# Patient Record
Sex: MALE | Race: BLACK OR AFRICAN AMERICAN | NOT HISPANIC OR LATINO | URBAN - METROPOLITAN AREA
[De-identification: names, ages, dates, MRNs, and addresses within clinical notes are randomized per-mention and may not be internally consistent; named-entity substitution may affect disease eponyms.]

---

## 2017-01-20 ENCOUNTER — OUTPATIENT (OUTPATIENT)
Dept: OUTPATIENT SERVICES | Facility: HOSPITAL | Age: 77
LOS: 1 days | End: 2017-01-20
Payer: COMMERCIAL

## 2017-01-20 VITALS
DIASTOLIC BLOOD PRESSURE: 77 MMHG | HEART RATE: 76 BPM | HEIGHT: 66 IN | RESPIRATION RATE: 12 BRPM | WEIGHT: 121.25 LBS | TEMPERATURE: 98 F | SYSTOLIC BLOOD PRESSURE: 143 MMHG | OXYGEN SATURATION: 100 %

## 2017-01-20 DIAGNOSIS — Z98.89 OTHER SPECIFIED POSTPROCEDURAL STATES: Chronic | ICD-10-CM

## 2017-01-20 DIAGNOSIS — N13.1 HYDRONEPHROSIS WITH URETERAL STRICTURE, NOT ELSEWHERE CLASSIFIED: ICD-10-CM

## 2017-01-20 DIAGNOSIS — N13.5 CROSSING VESSEL AND STRICTURE OF URETER WITHOUT HYDRONEPHROSIS: ICD-10-CM

## 2017-01-20 DIAGNOSIS — Z01.818 ENCOUNTER FOR OTHER PREPROCEDURAL EXAMINATION: ICD-10-CM

## 2017-01-20 LAB
ANION GAP SERPL CALC-SCNC: 16 MMOL/L — SIGNIFICANT CHANGE UP (ref 5–17)
BUN SERPL-MCNC: 44 MG/DL — HIGH (ref 7–23)
CALCIUM SERPL-MCNC: 7.9 MG/DL — LOW (ref 8.4–10.5)
CHLORIDE SERPL-SCNC: 109 MMOL/L — HIGH (ref 96–108)
CO2 SERPL-SCNC: 14 MMOL/L — LOW (ref 22–31)
CREAT SERPL-MCNC: 4.75 MG/DL — HIGH (ref 0.5–1.3)
GLUCOSE SERPL-MCNC: 92 MG/DL — SIGNIFICANT CHANGE UP (ref 70–99)
HCT VFR BLD CALC: 31.3 % — LOW (ref 39–50)
HGB BLD-MCNC: 9.7 G/DL — LOW (ref 13–17)
MCHC RBC-ENTMCNC: 25.1 PG — LOW (ref 27–34)
MCHC RBC-ENTMCNC: 31 GM/DL — LOW (ref 32–36)
MCV RBC AUTO: 81.1 FL — SIGNIFICANT CHANGE UP (ref 80–100)
PLATELET # BLD AUTO: 372 K/UL — SIGNIFICANT CHANGE UP (ref 150–400)
POTASSIUM SERPL-MCNC: 4.6 MMOL/L — SIGNIFICANT CHANGE UP (ref 3.5–5.3)
POTASSIUM SERPL-SCNC: 4.6 MMOL/L — SIGNIFICANT CHANGE UP (ref 3.5–5.3)
RBC # BLD: 3.86 M/UL — LOW (ref 4.2–5.8)
RBC # FLD: 18 % — HIGH (ref 10.3–14.5)
SODIUM SERPL-SCNC: 139 MMOL/L — SIGNIFICANT CHANGE UP (ref 135–145)
WBC # BLD: 6.03 K/UL — SIGNIFICANT CHANGE UP (ref 3.8–10.5)
WBC # FLD AUTO: 6.03 K/UL — SIGNIFICANT CHANGE UP (ref 3.8–10.5)

## 2017-01-20 RX ORDER — SODIUM CHLORIDE 9 MG/ML
3 INJECTION INTRAMUSCULAR; INTRAVENOUS; SUBCUTANEOUS EVERY 8 HOURS
Qty: 0 | Refills: 0 | Status: DISCONTINUED | OUTPATIENT
Start: 2017-01-26 | End: 2017-02-10

## 2017-01-20 RX ORDER — CEFAZOLIN SODIUM 1 G
2000 VIAL (EA) INJECTION ONCE
Qty: 0 | Refills: 0 | Status: DISCONTINUED | OUTPATIENT
Start: 2017-01-26 | End: 2017-02-10

## 2017-01-20 NOTE — H&P PST ADULT - PMH
Brain tumor  Distant history over 30 yrs ago  Carcinoid tumor    GERD (gastroesophageal reflux disease)    Gout    HTN (Hypertension)    Hydronephrosis    Seizure  x1 in july 2014  1 episode of tonic clonic seizure, pt was seen by neurologist and the daughter states everything was normal. No information available.  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 No information available.  Ureteral stricture

## 2017-01-20 NOTE — H&P PST ADULT - PSH
Brain tumor  1973  Craniotomy  History of cystoscopy  LEFT, stent placement 2015  S/P cystoscopy  right ureteral stent insertion 09/2014, 12/2014, and Jan 2016  S/P Right Hemicolectomy  Open right hemicolectomy in 2007 at [place]

## 2017-01-20 NOTE — H&P PST ADULT - FAMILY HISTORY
Sibling  Still living? Yes, Estimated age: Age Unknown  Family history of heart attack, Age at diagnosis: Age Unknown  Family history of cancer, Age at diagnosis: Age Unknown  Family history of dementia, Age at diagnosis: Age Unknown  Family history of diabetes mellitus, Age at diagnosis: Age Unknown

## 2017-01-20 NOTE — H&P PST ADULT - NSANTHOSAYNRD_GEN_A_CORE
No. RADHA screening performed.  STOP BANG Legend: 0-2 = LOW Risk; 3-4 = INTERMEDIATE Risk; 5-8 = HIGH Risk 13.5 inches/No. RADHA screening performed.  STOP BANG Legend: 0-2 = LOW Risk; 3-4 = INTERMEDIATE Risk; 5-8 = HIGH Risk

## 2017-01-20 NOTE — H&P PST ADULT - HISTORY OF PRESENT ILLNESS
77 y/o Black male with PMH: HTN, GERD, carcinoid with liver/kidney mets, R hydronephrosis s/p stent, seizure disorders, distant h/o brain tumor (>30yrs ago). H/O cystoscopy, with multiple right ureteral stent change . Pt presents to PST today for pre op evaluation in preparation for right tandem ureteral stent exchange on 09/29/16. 77 y/o Black male with PMH: HTN, GERD, carcinoid with liver/kidney mets,  seizure disorders( last 2015), distant h/o brain tumor (>30yrs ago). H/O cystoscopy, with multiple right ureteral stent change . Pt presents to PST today for pre op evaluation in preparation for cystoscopy, right ureteral stent exchange on 1/26/17.

## 2017-01-22 LAB
-  AMIKACIN: SIGNIFICANT CHANGE UP
-  AZTREONAM: SIGNIFICANT CHANGE UP
-  CEFEPIME: SIGNIFICANT CHANGE UP
-  CEFTAZIDIME: SIGNIFICANT CHANGE UP
-  CIPROFLOXACIN: SIGNIFICANT CHANGE UP
-  GENTAMICIN: SIGNIFICANT CHANGE UP
-  IMIPENEM: SIGNIFICANT CHANGE UP
-  LEVOFLOXACIN: SIGNIFICANT CHANGE UP
-  MEROPENEM: SIGNIFICANT CHANGE UP
-  PIPERACILLIN/TAZOBACTAM: SIGNIFICANT CHANGE UP
-  TOBRAMYCIN: SIGNIFICANT CHANGE UP
METHOD TYPE: SIGNIFICANT CHANGE UP

## 2017-01-23 LAB
CULTURE RESULTS: SIGNIFICANT CHANGE UP
ORGANISM # SPEC MICROSCOPIC CNT: SIGNIFICANT CHANGE UP
ORGANISM # SPEC MICROSCOPIC CNT: SIGNIFICANT CHANGE UP
SPECIMEN SOURCE: SIGNIFICANT CHANGE UP

## 2017-01-24 ENCOUNTER — CLINICAL ADVICE (OUTPATIENT)
Age: 77
End: 2017-01-24

## 2017-01-26 ENCOUNTER — OUTPATIENT (OUTPATIENT)
Dept: OUTPATIENT SERVICES | Facility: HOSPITAL | Age: 77
LOS: 1 days | Discharge: HOME | End: 2017-01-26
Payer: COMMERCIAL

## 2017-01-26 ENCOUNTER — APPOINTMENT (OUTPATIENT)
Dept: UROLOGY | Facility: HOSPITAL | Age: 77
End: 2017-01-26

## 2017-01-26 VITALS
DIASTOLIC BLOOD PRESSURE: 72 MMHG | OXYGEN SATURATION: 99 % | SYSTOLIC BLOOD PRESSURE: 162 MMHG | TEMPERATURE: 98 F | RESPIRATION RATE: 18 BRPM | HEIGHT: 66 IN | WEIGHT: 121.92 LBS | HEART RATE: 94 BPM

## 2017-01-26 VITALS
HEART RATE: 69 BPM | SYSTOLIC BLOOD PRESSURE: 127 MMHG | DIASTOLIC BLOOD PRESSURE: 66 MMHG | RESPIRATION RATE: 18 BRPM | OXYGEN SATURATION: 98 %

## 2017-01-26 DIAGNOSIS — Z98.89 OTHER SPECIFIED POSTPROCEDURAL STATES: Chronic | ICD-10-CM

## 2017-01-26 DIAGNOSIS — N13.5 CROSSING VESSEL AND STRICTURE OF URETER WITHOUT HYDRONEPHROSIS: ICD-10-CM

## 2017-01-26 PROCEDURE — 76000 FLUOROSCOPY <1 HR PHYS/QHP: CPT

## 2017-01-26 PROCEDURE — C1769: CPT

## 2017-01-26 PROCEDURE — 80048 BASIC METABOLIC PNL TOTAL CA: CPT

## 2017-01-26 PROCEDURE — 74420 UROGRAPHY RTRGR +-KUB: CPT | Mod: 26

## 2017-01-26 PROCEDURE — 85027 COMPLETE CBC AUTOMATED: CPT

## 2017-01-26 PROCEDURE — 52332 CYSTOSCOPY AND TREATMENT: CPT | Mod: RT

## 2017-01-26 PROCEDURE — 87186 SC STD MICRODIL/AGAR DIL: CPT

## 2017-01-26 PROCEDURE — C1758: CPT

## 2017-01-26 PROCEDURE — 87086 URINE CULTURE/COLONY COUNT: CPT

## 2017-01-26 PROCEDURE — G0463: CPT

## 2017-01-26 PROCEDURE — C2617: CPT

## 2017-01-26 RX ORDER — OXYCODONE HYDROCHLORIDE 5 MG/1
1 TABLET ORAL
Qty: 15 | Refills: 0 | OUTPATIENT
Start: 2017-01-26

## 2017-01-26 RX ORDER — ONDANSETRON 8 MG/1
4 TABLET, FILM COATED ORAL ONCE
Qty: 0 | Refills: 0 | Status: DISCONTINUED | OUTPATIENT
Start: 2017-01-26 | End: 2017-01-26

## 2017-01-26 RX ORDER — AMLODIPINE BESYLATE 10 MG/1
10 TABLET ORAL
Qty: 30 | Refills: 0 | Status: ACTIVE | COMMUNITY
Start: 2016-01-20

## 2017-01-26 RX ORDER — CIPROFLOXACIN HYDROCHLORIDE 500 MG/1
500 TABLET, FILM COATED ORAL
Qty: 14 | Refills: 0 | Status: COMPLETED | COMMUNITY
Start: 2016-09-22

## 2017-01-26 RX ORDER — HYDROMORPHONE HYDROCHLORIDE 2 MG/ML
0.25 INJECTION INTRAMUSCULAR; INTRAVENOUS; SUBCUTANEOUS
Qty: 0 | Refills: 0 | Status: DISCONTINUED | OUTPATIENT
Start: 2017-01-26 | End: 2017-01-26

## 2017-01-26 RX ORDER — SODIUM CHLORIDE 9 MG/ML
1000 INJECTION, SOLUTION INTRAVENOUS
Qty: 0 | Refills: 0 | Status: DISCONTINUED | OUTPATIENT
Start: 2017-01-26 | End: 2017-02-10

## 2017-01-26 RX ORDER — PHENAZOPYRIDINE HCL 100 MG
200 TABLET ORAL ONCE
Qty: 0 | Refills: 0 | Status: COMPLETED | OUTPATIENT
Start: 2017-01-26 | End: 2017-01-26

## 2017-01-26 RX ORDER — CIPROFLOXACIN LACTATE 400MG/40ML
1 VIAL (ML) INTRAVENOUS
Qty: 10 | Refills: 0 | OUTPATIENT
Start: 2017-01-26 | End: 2017-01-31

## 2017-01-26 RX ADMIN — Medication 200 MILLIGRAM(S): at 12:42

## 2017-01-26 RX ADMIN — SODIUM CHLORIDE 3 MILLILITER(S): 9 INJECTION INTRAMUSCULAR; INTRAVENOUS; SUBCUTANEOUS at 08:36

## 2017-01-26 NOTE — ASU DISCHARGE PLAN (ADULT/PEDIATRIC). - MEDICATION SUMMARY - MEDICATIONS TO TAKE
I will START or STAY ON the medications listed below when I get home from the hospital:    Philip Naphcon  -- 2 drop(s) intraocular ,both eyes As Needed  -- Indication: For Home med    acetaminophen-oxyCODONE 325 mg-5 mg oral tablet  -- 1 tab(s) by mouth every 6 hours, As Needed -for severe pain MDD:4 tabs  -- Caution federal law prohibits the transfer of this drug to any person other  than the person for whom it was prescribed.  May cause drowsiness.  Alcohol may intensify this effect.  Use care when operating dangerous machinery.  This prescription cannot be refilled.  This product contains acetaminophen.  Do not use  with any other product containing acetaminophen to prevent possible liver damage.  Using more of this medication than prescribed may cause serious breathing problems.    -- Indication: For pain med    levETIRAcetam 250 mg oral tablet  -- 1 tab(s) by mouth 2 times a day  -- Indication: For home med    Norvasc 10 mg oral tablet  -- 1 tab(s) by mouth once a day in am  -- Indication: For home med    Cipro 250 mg oral tablet  -- 1 tab(s) by mouth every 12 hours  -- Avoid prolonged or excessive exposure to direct and/or artificial sunlight while taking this medication.  Check with your doctor before becoming pregnant.  Do not take dairy products, antacids, or iron preparations within one hour of this medication.  Finish all this medication unless otherwise directed by prescriber.  Medication should be taken with plenty of water.    -- Indication: For antibiotic    Vitamin B Complex 100  -- 1 tab(s) by mouth once a day, am  -- Indication: For home med

## 2017-01-26 NOTE — ASU DISCHARGE PLAN (ADULT/PEDIATRIC). - NOTIFY
Bleeding that does not stop/Persistent Nausea and Vomiting/Unable to Urinate/Pain not relieved by Medications/Inability to Tolerate Liquids or Foods/Fever greater than 101

## 2017-01-26 NOTE — BRIEF OPERATIVE NOTE - PRE-OP DX
Hydronephrosis of right kidney  01/26/2017    Active  Jonas Doyle  Ureteral obstruction, right  01/26/2017    Active  Jonas Doyle

## 2017-01-26 NOTE — BRIEF OPERATIVE NOTE - POST-OP DX
Hydronephrosis, right  01/26/2017    Active  Jonas Doyle  Ureteral obstruction, right  01/26/2017    Active  Jonas Doyle

## 2017-02-12 ENCOUNTER — FORM ENCOUNTER (OUTPATIENT)
Age: 77
End: 2017-02-12

## 2017-02-13 ENCOUNTER — APPOINTMENT (OUTPATIENT)
Dept: UROLOGY | Facility: CLINIC | Age: 77
End: 2017-02-13

## 2017-02-13 ENCOUNTER — APPOINTMENT (OUTPATIENT)
Dept: CT IMAGING | Facility: IMAGING CENTER | Age: 77
End: 2017-02-13

## 2017-02-13 ENCOUNTER — OUTPATIENT (OUTPATIENT)
Dept: OUTPATIENT SERVICES | Facility: HOSPITAL | Age: 77
LOS: 1 days | End: 2017-02-13
Payer: COMMERCIAL

## 2017-02-13 VITALS
SYSTOLIC BLOOD PRESSURE: 142 MMHG | BODY MASS INDEX: 19.93 KG/M2 | TEMPERATURE: 97.8 F | HEART RATE: 72 BPM | DIASTOLIC BLOOD PRESSURE: 65 MMHG | WEIGHT: 124 LBS | RESPIRATION RATE: 16 BRPM | HEIGHT: 66 IN

## 2017-02-13 DIAGNOSIS — N13.30 UNSPECIFIED HYDRONEPHROSIS: ICD-10-CM

## 2017-02-13 DIAGNOSIS — N13.5 CROSSING VESSEL AND STRICTURE OF URETER W/OUT HYDRONEPHROSIS: ICD-10-CM

## 2017-02-13 DIAGNOSIS — Z98.89 OTHER SPECIFIED POSTPROCEDURAL STATES: Chronic | ICD-10-CM

## 2017-02-13 DIAGNOSIS — D3A.00 BENIGN CARCINOID TUMOR OF UNSPECIFIED SITE: ICD-10-CM

## 2017-02-13 DIAGNOSIS — N13.5 CROSSING VESSEL AND STRICTURE OF URETER WITHOUT HYDRONEPHROSIS: ICD-10-CM

## 2017-02-13 PROCEDURE — 74176 CT ABD & PELVIS W/O CONTRAST: CPT

## 2017-02-13 RX ORDER — OXYCODONE AND ACETAMINOPHEN 5; 325 MG/1; MG/1
5-325 TABLET ORAL
Qty: 15 | Refills: 0 | Status: COMPLETED | COMMUNITY
Start: 2017-01-26

## 2017-06-22 ENCOUNTER — OUTPATIENT (OUTPATIENT)
Dept: OUTPATIENT SERVICES | Facility: HOSPITAL | Age: 77
LOS: 1 days | End: 2017-06-22
Payer: COMMERCIAL

## 2017-06-22 VITALS
HEIGHT: 66 IN | TEMPERATURE: 98 F | OXYGEN SATURATION: 100 % | RESPIRATION RATE: 14 BRPM | HEART RATE: 63 BPM | DIASTOLIC BLOOD PRESSURE: 75 MMHG | WEIGHT: 123.02 LBS | SYSTOLIC BLOOD PRESSURE: 140 MMHG

## 2017-06-22 DIAGNOSIS — I10 ESSENTIAL (PRIMARY) HYPERTENSION: ICD-10-CM

## 2017-06-22 DIAGNOSIS — Z98.89 OTHER SPECIFIED POSTPROCEDURAL STATES: Chronic | ICD-10-CM

## 2017-06-22 DIAGNOSIS — G40.909 EPILEPSY, UNSPECIFIED, NOT INTRACTABLE, WITHOUT STATUS EPILEPTICUS: ICD-10-CM

## 2017-06-22 DIAGNOSIS — Z01.818 ENCOUNTER FOR OTHER PREPROCEDURAL EXAMINATION: ICD-10-CM

## 2017-06-22 DIAGNOSIS — N13.1 HYDRONEPHROSIS WITH URETERAL STRICTURE, NOT ELSEWHERE CLASSIFIED: ICD-10-CM

## 2017-06-22 DIAGNOSIS — N13.5 CROSSING VESSEL AND STRICTURE OF URETER WITHOUT HYDRONEPHROSIS: ICD-10-CM

## 2017-06-22 LAB
ANION GAP SERPL CALC-SCNC: 17 MMOL/L — SIGNIFICANT CHANGE UP (ref 5–17)
BUN SERPL-MCNC: 47 MG/DL — HIGH (ref 7–23)
CALCIUM SERPL-MCNC: 7 MG/DL — LOW (ref 8.4–10.5)
CHLORIDE SERPL-SCNC: 108 MMOL/L — SIGNIFICANT CHANGE UP (ref 96–108)
CO2 SERPL-SCNC: 13 MMOL/L — LOW (ref 22–31)
CREAT SERPL-MCNC: 4.77 MG/DL — HIGH (ref 0.5–1.3)
GLUCOSE SERPL-MCNC: 64 MG/DL — LOW (ref 70–99)
HCT VFR BLD CALC: 30 % — LOW (ref 39–50)
HGB BLD-MCNC: 9.5 G/DL — LOW (ref 13–17)
MCHC RBC-ENTMCNC: 25.5 PG — LOW (ref 27–34)
MCHC RBC-ENTMCNC: 31.7 GM/DL — LOW (ref 32–36)
MCV RBC AUTO: 80.6 FL — SIGNIFICANT CHANGE UP (ref 80–100)
PLATELET # BLD AUTO: 371 K/UL — SIGNIFICANT CHANGE UP (ref 150–400)
POTASSIUM SERPL-MCNC: 4.9 MMOL/L — SIGNIFICANT CHANGE UP (ref 3.5–5.3)
POTASSIUM SERPL-SCNC: 4.9 MMOL/L — SIGNIFICANT CHANGE UP (ref 3.5–5.3)
RBC # BLD: 3.72 M/UL — LOW (ref 4.2–5.8)
RBC # FLD: 18 % — HIGH (ref 10.3–14.5)
SODIUM SERPL-SCNC: 138 MMOL/L — SIGNIFICANT CHANGE UP (ref 135–145)
WBC # BLD: 6.03 K/UL — SIGNIFICANT CHANGE UP (ref 3.8–10.5)
WBC # FLD AUTO: 6.03 K/UL — SIGNIFICANT CHANGE UP (ref 3.8–10.5)

## 2017-06-22 RX ORDER — CEFAZOLIN SODIUM 1 G
2000 VIAL (EA) INJECTION ONCE
Qty: 0 | Refills: 0 | Status: DISCONTINUED | OUTPATIENT
Start: 2017-06-29 | End: 2017-07-14

## 2017-06-22 NOTE — H&P PST ADULT - GASTROINTESTINAL COMMENTS
hx tumor  carcinoid  tumor abdominal area been  stable follow-up by PMD get treatment every month nontender

## 2017-06-22 NOTE — H&P PST ADULT - PSH
Brain tumor  1973  Craniotomy  History of cystoscopy  LEFT, stent placement 2015  S/P cystoscopy  right ureteral stent insertion 09/2014, 12/2014, and Jan 2016    now change t every 5 months  S/P Right Hemicolectomy  Open right hemicolectomy in 2007 at [place]

## 2017-06-22 NOTE — H&P PST ADULT - HISTORY OF PRESENT ILLNESS
76 y/o male for  cystoscopy and right tandem ureteral stent change. Patient has hx of ureteral stricture since 2014 and has regular stent change due this time every 5 months.

## 2017-06-22 NOTE — H&P PST ADULT - PROBLEM SELECTOR PLAN 1
cystoscopy right tandem ureteral stent change cystoscopy right tandem ureteral stent change   Patient has own appt to see Dr Venkatesh cota

## 2017-06-22 NOTE — H&P PST ADULT - PMH
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 No information available.  Seizure disorder  on meds controlled no episode  x 1 year as per daughter  Ureteral stricture

## 2017-06-24 LAB
-  AMIKACIN: SIGNIFICANT CHANGE UP
-  AMPICILLIN/SULBACTAM: SIGNIFICANT CHANGE UP
-  AMPICILLIN: SIGNIFICANT CHANGE UP
-  AZTREONAM: SIGNIFICANT CHANGE UP
-  CEFAZOLIN: SIGNIFICANT CHANGE UP
-  CEFEPIME: SIGNIFICANT CHANGE UP
-  CEFOXITIN: SIGNIFICANT CHANGE UP
-  CEFTAZIDIME: SIGNIFICANT CHANGE UP
-  CEFTRIAXONE: SIGNIFICANT CHANGE UP
-  CIPROFLOXACIN: SIGNIFICANT CHANGE UP
-  ERTAPENEM: SIGNIFICANT CHANGE UP
-  GENTAMICIN: SIGNIFICANT CHANGE UP
-  LEVOFLOXACIN: SIGNIFICANT CHANGE UP
-  MEROPENEM: SIGNIFICANT CHANGE UP
-  NITROFURANTOIN: SIGNIFICANT CHANGE UP
-  PIPERACILLIN/TAZOBACTAM: SIGNIFICANT CHANGE UP
-  TOBRAMYCIN: SIGNIFICANT CHANGE UP
-  TRIMETHOPRIM/SULFAMETHOXAZOLE: SIGNIFICANT CHANGE UP
CULTURE RESULTS: SIGNIFICANT CHANGE UP
METHOD TYPE: SIGNIFICANT CHANGE UP
ORGANISM # SPEC MICROSCOPIC CNT: SIGNIFICANT CHANGE UP
ORGANISM # SPEC MICROSCOPIC CNT: SIGNIFICANT CHANGE UP
SPECIMEN SOURCE: SIGNIFICANT CHANGE UP

## 2017-06-26 RX ORDER — CIPROFLOXACIN HYDROCHLORIDE 250 MG/1
250 TABLET, FILM COATED ORAL
Qty: 14 | Refills: 0 | Status: ACTIVE | COMMUNITY
Start: 2017-06-26 | End: 1900-01-01

## 2017-06-29 ENCOUNTER — OUTPATIENT (OUTPATIENT)
Dept: OUTPATIENT SERVICES | Facility: HOSPITAL | Age: 77
LOS: 1 days | End: 2017-06-29
Payer: COMMERCIAL

## 2017-06-29 ENCOUNTER — TRANSCRIPTION ENCOUNTER (OUTPATIENT)
Age: 77
End: 2017-06-29

## 2017-06-29 ENCOUNTER — APPOINTMENT (OUTPATIENT)
Dept: UROLOGY | Facility: HOSPITAL | Age: 77
End: 2017-06-29

## 2017-06-29 VITALS
HEIGHT: 66 IN | SYSTOLIC BLOOD PRESSURE: 158 MMHG | WEIGHT: 126.99 LBS | HEART RATE: 80 BPM | OXYGEN SATURATION: 99 % | TEMPERATURE: 98 F | DIASTOLIC BLOOD PRESSURE: 75 MMHG | RESPIRATION RATE: 18 BRPM

## 2017-06-29 VITALS
HEART RATE: 69 BPM | DIASTOLIC BLOOD PRESSURE: 69 MMHG | SYSTOLIC BLOOD PRESSURE: 133 MMHG | RESPIRATION RATE: 17 BRPM | TEMPERATURE: 98 F | OXYGEN SATURATION: 99 %

## 2017-06-29 DIAGNOSIS — Z98.89 OTHER SPECIFIED POSTPROCEDURAL STATES: Chronic | ICD-10-CM

## 2017-06-29 DIAGNOSIS — N13.5 CROSSING VESSEL AND STRICTURE OF URETER WITHOUT HYDRONEPHROSIS: ICD-10-CM

## 2017-06-29 PROCEDURE — C1769: CPT

## 2017-06-29 PROCEDURE — C1758: CPT

## 2017-06-29 PROCEDURE — 87186 SC STD MICRODIL/AGAR DIL: CPT

## 2017-06-29 PROCEDURE — 52332 CYSTOSCOPY AND TREATMENT: CPT | Mod: RT

## 2017-06-29 PROCEDURE — 76000 FLUOROSCOPY <1 HR PHYS/QHP: CPT

## 2017-06-29 PROCEDURE — 74420 UROGRAPHY RTRGR +-KUB: CPT | Mod: 26

## 2017-06-29 PROCEDURE — 87086 URINE CULTURE/COLONY COUNT: CPT

## 2017-06-29 PROCEDURE — G0463: CPT

## 2017-06-29 PROCEDURE — 80048 BASIC METABOLIC PNL TOTAL CA: CPT

## 2017-06-29 PROCEDURE — C2617: CPT

## 2017-06-29 PROCEDURE — 85027 COMPLETE CBC AUTOMATED: CPT

## 2017-06-29 RX ORDER — HYDROMORPHONE HYDROCHLORIDE 2 MG/ML
0.25 INJECTION INTRAMUSCULAR; INTRAVENOUS; SUBCUTANEOUS
Qty: 0 | Refills: 0 | Status: DISCONTINUED | OUTPATIENT
Start: 2017-06-29 | End: 2017-06-29

## 2017-06-29 RX ORDER — SODIUM CHLORIDE 9 MG/ML
1000 INJECTION INTRAMUSCULAR; INTRAVENOUS; SUBCUTANEOUS
Qty: 0 | Refills: 0 | Status: DISCONTINUED | OUTPATIENT
Start: 2017-06-29 | End: 2017-07-14

## 2017-06-29 RX ORDER — ACETAMINOPHEN 500 MG
650 TABLET ORAL EVERY 6 HOURS
Qty: 0 | Refills: 0 | Status: DISCONTINUED | OUTPATIENT
Start: 2017-06-29 | End: 2017-07-14

## 2017-06-29 RX ORDER — ONDANSETRON 8 MG/1
4 TABLET, FILM COATED ORAL ONCE
Qty: 0 | Refills: 0 | Status: DISCONTINUED | OUTPATIENT
Start: 2017-06-29 | End: 2017-06-29

## 2017-06-29 RX ORDER — OXYCODONE HYDROCHLORIDE 5 MG/1
1 TABLET ORAL
Qty: 20 | Refills: 0 | OUTPATIENT
Start: 2017-06-29

## 2017-06-29 RX ORDER — SODIUM CHLORIDE 9 MG/ML
3 INJECTION INTRAMUSCULAR; INTRAVENOUS; SUBCUTANEOUS EVERY 8 HOURS
Qty: 0 | Refills: 0 | Status: DISCONTINUED | OUTPATIENT
Start: 2017-06-29 | End: 2017-06-29

## 2017-06-29 RX ADMIN — SODIUM CHLORIDE 125 MILLILITER(S): 9 INJECTION INTRAMUSCULAR; INTRAVENOUS; SUBCUTANEOUS at 16:39

## 2017-06-29 NOTE — ASU DISCHARGE PLAN (ADULT/PEDIATRIC). - MEDICATION SUMMARY - MEDICATIONS TO TAKE
I will START or STAY ON the medications listed below when I get home from the hospital:    Philip Naphcon  -- 2 drop(s) intraocular ,both eyes As Needed  -- Indication: For Home medication    acetaminophen-oxycodone 325 mg-5 mg oral tablet  -- 1 tab(s) by mouth every 4 hours, As needed, Mild Pain MDD:8  -- Indication: For Pain medication     levETIRAcetam 250 mg oral tablet  -- 1 tab(s) by mouth 2 times a day  -- Indication: For home medication    Norvasc 10 mg oral tablet  -- 1 tab(s) by mouth once a day in am  -- Indication: For home medication     Vitamin B Complex 100  -- 1 tab(s) by mouth once a day, am  -- Indication: For home medication

## 2017-08-29 ENCOUNTER — INPATIENT (INPATIENT)
Facility: HOSPITAL | Age: 77
LOS: 1 days | End: 2017-08-31
Attending: INTERNAL MEDICINE | Admitting: INTERNAL MEDICINE
Payer: MEDICARE

## 2017-08-29 VITALS
OXYGEN SATURATION: 100 % | HEART RATE: 95 BPM | DIASTOLIC BLOOD PRESSURE: 68 MMHG | TEMPERATURE: 93 F | SYSTOLIC BLOOD PRESSURE: 128 MMHG | RESPIRATION RATE: 18 BRPM

## 2017-08-29 DIAGNOSIS — G40.909 EPILEPSY, UNSPECIFIED, NOT INTRACTABLE, WITHOUT STATUS EPILEPTICUS: ICD-10-CM

## 2017-08-29 DIAGNOSIS — I46.9 CARDIAC ARREST, CAUSE UNSPECIFIED: ICD-10-CM

## 2017-08-29 DIAGNOSIS — D3A.00 BENIGN CARCINOID TUMOR OF UNSPECIFIED SITE: ICD-10-CM

## 2017-08-29 DIAGNOSIS — Z98.89 OTHER SPECIFIED POSTPROCEDURAL STATES: Chronic | ICD-10-CM

## 2017-08-29 DIAGNOSIS — Z29.9 ENCOUNTER FOR PROPHYLACTIC MEASURES, UNSPECIFIED: ICD-10-CM

## 2017-08-29 DIAGNOSIS — T68.XXXA HYPOTHERMIA, INITIAL ENCOUNTER: ICD-10-CM

## 2017-08-29 PROBLEM — N13.1 HYDRONEPHROSIS WITH URETERAL STRICTURE, NOT ELSEWHERE CLASSIFIED: Chronic | Status: ACTIVE | Noted: 2017-01-20

## 2017-08-29 LAB
ALBUMIN SERPL ELPH-MCNC: 3.7 G/DL — SIGNIFICANT CHANGE UP (ref 3.3–5)
ALP SERPL-CCNC: 122 U/L — HIGH (ref 40–120)
ALT FLD-CCNC: 10 U/L — SIGNIFICANT CHANGE UP (ref 4–41)
APTT BLD: 43.6 SEC — HIGH (ref 27.5–37.4)
AST SERPL-CCNC: 20 U/L — SIGNIFICANT CHANGE UP (ref 4–40)
BASE EXCESS BLDV CALC-SCNC: -26.5 MMOL/L — SIGNIFICANT CHANGE UP
BASOPHILS # BLD AUTO: 0.03 K/UL — SIGNIFICANT CHANGE UP (ref 0–0.2)
BASOPHILS NFR BLD AUTO: 0.3 % — SIGNIFICANT CHANGE UP (ref 0–2)
BILIRUB SERPL-MCNC: 0.6 MG/DL — SIGNIFICANT CHANGE UP (ref 0.2–1.2)
BLD GP AB SCN SERPL QL: NEGATIVE — SIGNIFICANT CHANGE UP
BLOOD GAS VENOUS - CREATININE: 5.58 MG/DL — HIGH (ref 0.5–1.3)
BUN SERPL-MCNC: 39 MG/DL — HIGH (ref 7–23)
CALCIUM SERPL-MCNC: 7 MG/DL — LOW (ref 8.4–10.5)
CHLORIDE BLDV-SCNC: 117 MMOL/L — HIGH (ref 96–108)
CHLORIDE SERPL-SCNC: 109 MMOL/L — HIGH (ref 98–107)
CK MB BLD-MCNC: 1.2 — SIGNIFICANT CHANGE UP (ref 0–2.5)
CK MB BLD-MCNC: 2.76 NG/ML — SIGNIFICANT CHANGE UP (ref 1–6.6)
CK SERPL-CCNC: 226 U/L — HIGH (ref 30–200)
CO2 SERPL-SCNC: 4 MMOL/L — CRITICAL LOW (ref 22–31)
CREAT SERPL-MCNC: 5.33 MG/DL — HIGH (ref 0.5–1.3)
EOSINOPHIL # BLD AUTO: 0.13 K/UL — SIGNIFICANT CHANGE UP (ref 0–0.5)
EOSINOPHIL NFR BLD AUTO: 1.2 % — SIGNIFICANT CHANGE UP (ref 0–6)
GAS PNL BLDV: 136 MMOL/L — SIGNIFICANT CHANGE UP (ref 136–146)
GLUCOSE BLDV-MCNC: 228 — HIGH (ref 70–99)
GLUCOSE SERPL-MCNC: 236 MG/DL — HIGH (ref 70–99)
HCO3 BLDV-SCNC: 5 MMOL/L — LOW (ref 20–27)
HCT VFR BLD CALC: 31.1 % — LOW (ref 39–50)
HCT VFR BLDV CALC: 31.2 % — LOW (ref 39–51)
HGB BLD-MCNC: 9.4 G/DL — LOW (ref 13–17)
HGB BLDV-MCNC: 10.1 G/DL — LOW (ref 13–17)
IMM GRANULOCYTES # BLD AUTO: 0.14 # — SIGNIFICANT CHANGE UP
IMM GRANULOCYTES NFR BLD AUTO: 1.3 % — SIGNIFICANT CHANGE UP (ref 0–1.5)
INR BLD: 1.05 — SIGNIFICANT CHANGE UP (ref 0.88–1.17)
LACTATE BLDV-MCNC: 10.5 MMOL/L — CRITICAL HIGH (ref 0.5–2)
LYMPHOCYTES # BLD AUTO: 2.31 K/UL — SIGNIFICANT CHANGE UP (ref 1–3.3)
LYMPHOCYTES # BLD AUTO: 22 % — SIGNIFICANT CHANGE UP (ref 13–44)
MCHC RBC-ENTMCNC: 27.1 PG — SIGNIFICANT CHANGE UP (ref 27–34)
MCHC RBC-ENTMCNC: 30.2 % — LOW (ref 32–36)
MCV RBC AUTO: 89.6 FL — SIGNIFICANT CHANGE UP (ref 80–100)
MONOCYTES # BLD AUTO: 0.76 K/UL — SIGNIFICANT CHANGE UP (ref 0–0.9)
MONOCYTES NFR BLD AUTO: 7.2 % — SIGNIFICANT CHANGE UP (ref 2–14)
NEUTROPHILS # BLD AUTO: 7.13 K/UL — SIGNIFICANT CHANGE UP (ref 1.8–7.4)
NEUTROPHILS NFR BLD AUTO: 68 % — SIGNIFICANT CHANGE UP (ref 43–77)
NRBC # FLD: 0.03 — SIGNIFICANT CHANGE UP
PCO2 BLDV: 55 MMHG — HIGH (ref 41–51)
PH BLDV: < 6.81 PH — CRITICAL LOW (ref 7.32–7.43)
PLATELET # BLD AUTO: 289 K/UL — SIGNIFICANT CHANGE UP (ref 150–400)
PMV BLD: 10.4 FL — SIGNIFICANT CHANGE UP (ref 7–13)
PO2 BLDV: 212 MMHG — HIGH (ref 35–40)
POTASSIUM BLDV-SCNC: 8 MMOL/L — CRITICAL HIGH (ref 3.4–4.5)
POTASSIUM SERPL-MCNC: 4.9 MMOL/L — SIGNIFICANT CHANGE UP (ref 3.5–5.3)
POTASSIUM SERPL-SCNC: 4.9 MMOL/L — SIGNIFICANT CHANGE UP (ref 3.5–5.3)
PROT SERPL-MCNC: 6.4 G/DL — SIGNIFICANT CHANGE UP (ref 6–8.3)
PROTHROM AB SERPL-ACNC: 11.8 SEC — SIGNIFICANT CHANGE UP (ref 9.8–13.1)
RBC # BLD: 3.47 M/UL — LOW (ref 4.2–5.8)
RBC # FLD: 17.4 % — HIGH (ref 10.3–14.5)
RH IG SCN BLD-IMP: POSITIVE — SIGNIFICANT CHANGE UP
SAO2 % BLDV: 96.6 % — HIGH (ref 60–85)
SODIUM SERPL-SCNC: 139 MMOL/L — SIGNIFICANT CHANGE UP (ref 135–145)
TROPONIN T SERPL-MCNC: < 0.06 NG/ML — SIGNIFICANT CHANGE UP (ref 0–0.06)
WBC # BLD: 10.5 K/UL — SIGNIFICANT CHANGE UP (ref 3.8–10.5)
WBC # FLD AUTO: 10.5 K/UL — SIGNIFICANT CHANGE UP (ref 3.8–10.5)

## 2017-08-29 PROCEDURE — 99291 CRITICAL CARE FIRST HOUR: CPT

## 2017-08-29 PROCEDURE — 71250 CT THORAX DX C-: CPT | Mod: 26

## 2017-08-29 PROCEDURE — 70450 CT HEAD/BRAIN W/O DYE: CPT | Mod: 26

## 2017-08-29 PROCEDURE — 74176 CT ABD & PELVIS W/O CONTRAST: CPT | Mod: 26

## 2017-08-29 PROCEDURE — 71010: CPT | Mod: 26

## 2017-08-29 RX ORDER — FENTANYL CITRATE 50 UG/ML
1.5 INJECTION INTRAVENOUS
Qty: 2500 | Refills: 0 | Status: DISCONTINUED | OUTPATIENT
Start: 2017-08-29 | End: 2017-08-30

## 2017-08-29 RX ORDER — LEVETIRACETAM 250 MG/1
250 TABLET, FILM COATED ORAL EVERY 12 HOURS
Qty: 0 | Refills: 0 | Status: DISCONTINUED | OUTPATIENT
Start: 2017-08-29 | End: 2017-08-31

## 2017-08-29 RX ORDER — PIPERACILLIN AND TAZOBACTAM 4; .5 G/20ML; G/20ML
3.38 INJECTION, POWDER, LYOPHILIZED, FOR SOLUTION INTRAVENOUS ONCE
Qty: 0 | Refills: 0 | Status: COMPLETED | OUTPATIENT
Start: 2017-08-29 | End: 2017-08-29

## 2017-08-29 RX ORDER — SODIUM CHLORIDE 9 MG/ML
1000 INJECTION INTRAMUSCULAR; INTRAVENOUS; SUBCUTANEOUS ONCE
Qty: 0 | Refills: 0 | Status: COMPLETED | OUTPATIENT
Start: 2017-08-29 | End: 2017-08-29

## 2017-08-29 RX ORDER — FENTANYL CITRATE 50 UG/ML
50 INJECTION INTRAVENOUS ONCE
Qty: 0 | Refills: 0 | Status: DISCONTINUED | OUTPATIENT
Start: 2017-08-29 | End: 2017-08-29

## 2017-08-29 RX ORDER — HEPARIN SODIUM 5000 [USP'U]/ML
5000 INJECTION INTRAVENOUS; SUBCUTANEOUS EVERY 8 HOURS
Qty: 0 | Refills: 0 | Status: DISCONTINUED | OUTPATIENT
Start: 2017-08-29 | End: 2017-08-31

## 2017-08-29 RX ORDER — PIPERACILLIN AND TAZOBACTAM 4; .5 G/20ML; G/20ML
3.38 INJECTION, POWDER, LYOPHILIZED, FOR SOLUTION INTRAVENOUS EVERY 12 HOURS
Qty: 0 | Refills: 0 | Status: DISCONTINUED | OUTPATIENT
Start: 2017-08-29 | End: 2017-08-31

## 2017-08-29 RX ORDER — NOREPINEPHRINE BITARTRATE/D5W 8 MG/250ML
0.1 PLASTIC BAG, INJECTION (ML) INTRAVENOUS
Qty: 8 | Refills: 0 | Status: DISCONTINUED | OUTPATIENT
Start: 2017-08-29 | End: 2017-08-31

## 2017-08-29 RX ORDER — VANCOMYCIN HCL 1 G
1000 VIAL (EA) INTRAVENOUS ONCE
Qty: 0 | Refills: 0 | Status: COMPLETED | OUTPATIENT
Start: 2017-08-29 | End: 2017-08-29

## 2017-08-29 RX ADMIN — PIPERACILLIN AND TAZOBACTAM 25 GRAM(S): 4; .5 INJECTION, POWDER, LYOPHILIZED, FOR SOLUTION INTRAVENOUS at 18:29

## 2017-08-29 RX ADMIN — FENTANYL CITRATE 50 MICROGRAM(S): 50 INJECTION INTRAVENOUS at 13:12

## 2017-08-29 RX ADMIN — SODIUM CHLORIDE 2000 MILLILITER(S): 9 INJECTION INTRAMUSCULAR; INTRAVENOUS; SUBCUTANEOUS at 22:05

## 2017-08-29 RX ADMIN — Medication 4 MILLIGRAM(S): at 15:00

## 2017-08-29 RX ADMIN — Medication 11.25 MICROGRAM(S)/KG/MIN: at 09:20

## 2017-08-29 RX ADMIN — FENTANYL CITRATE 5.36 MICROGRAM(S)/KG/HR: 50 INJECTION INTRAVENOUS at 13:12

## 2017-08-29 RX ADMIN — PIPERACILLIN AND TAZOBACTAM 200 GRAM(S): 4; .5 INJECTION, POWDER, LYOPHILIZED, FOR SOLUTION INTRAVENOUS at 10:03

## 2017-08-29 RX ADMIN — Medication 11.25 MICROGRAM(S)/KG/MIN: at 13:56

## 2017-08-29 RX ADMIN — FENTANYL CITRATE 8.04 MICROGRAM(S)/KG/HR: 50 INJECTION INTRAVENOUS at 22:05

## 2017-08-29 RX ADMIN — Medication 11.25 MICROGRAM(S)/KG/MIN: at 22:05

## 2017-08-29 RX ADMIN — SODIUM CHLORIDE 1000 MILLILITER(S): 9 INJECTION INTRAMUSCULAR; INTRAVENOUS; SUBCUTANEOUS at 09:20

## 2017-08-29 RX ADMIN — Medication 250 MILLIGRAM(S): at 10:45

## 2017-08-29 RX ADMIN — FENTANYL CITRATE 1.5 MICROGRAM(S)/KG/HR: 50 INJECTION INTRAVENOUS at 13:56

## 2017-08-29 RX ADMIN — FENTANYL CITRATE 50 MICROGRAM(S): 50 INJECTION INTRAVENOUS at 13:00

## 2017-08-29 RX ADMIN — LEVETIRACETAM 400 MILLIGRAM(S): 250 TABLET, FILM COATED ORAL at 17:05

## 2017-08-29 RX ADMIN — SODIUM CHLORIDE 1000 MILLILITER(S): 9 INJECTION INTRAMUSCULAR; INTRAVENOUS; SUBCUTANEOUS at 10:03

## 2017-08-29 RX ADMIN — HEPARIN SODIUM 5000 UNIT(S): 5000 INJECTION INTRAVENOUS; SUBCUTANEOUS at 22:04

## 2017-08-29 NOTE — H&P ADULT - NSHPPHYSICALEXAM_GEN_ALL_CORE
GENERAL APPEARANCE: NAD  HEENT:  NC/AT, clear conjunctiva, pupils fixed and dilated  NECK: Neck supple, non-tender without lymphadenopathy, masses  CARDIAC: Normal S1 and S2. No S3, S4 or murmurs. Rhythm is regular.  LUNGS: Mild bibasilar crackes, intubated on vent  ABDOMEN: Soft, distended, + umbilical mass  MUSKULOSKELETAL: No joint erythema  EXTREMITIES: No edema.  NEUROLOGICAL: Pupils fixed and dilated, unresponsive to verbal or physical stimuli  SKIN: Skin clean, dry, intact

## 2017-08-29 NOTE — H&P ADULT - HISTORY OF PRESENT ILLNESS
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 Hx largely obtained from daughter who was at bedside and EMS who responded at the scene. Per daughter, patient was in his usual state of health prior to yesterday. 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 Hx largely obtained from daughter who was at bedside and EMS who responded at the scene. Per daughter, patient was in his usual state of health prior to yesterday. Denies noticing any new symptoms, no chest pain, SOB, nausea, vomiting, fever, or chills. Daughter found him face down on his bed this morning with blood in his mouth. He was last seen awake and alert at ~11pm last night. Daughter went to check in on him this morning as part of her morning routine, she denies hearing a thump or pt groaning. Per EMS, patient was unresponsive and rhythm revealed asystole. He was started on CPR and coded for total of 15 mins with 2 rounds of epi with ROSC. 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 Hx largely obtained from daughter who was at bedside and EMS who responded at the scene. Per daughter, patient was in his usual state of health prior to yesterday. Denies noticing any new symptoms, no chest pain, SOB, nausea, vomiting, fever, or chills. He follows with Dr. Boogie as his oncology and gets weekly somatostatin injections for chronic diarrhea. Daughter found him face down on his bed this morning with blood in his mouth. He was last seen awake and alert at ~11pm last night. Daughter went to check in on him this morning as part of her morning routine, she denies hearing a thump or pt groaning. Per EMS, patient was unresponsive and rhythm revealed asystole. He was started on CPR and coded for total of 15 mins with 2 rounds of epi with ROSC. Per EMS, pt did not have blood in his mouth, but had food in his oral cavity when suctioned.        Vital Signs Last 24 Hrs  T(C): 32.7 (29 Aug 2017 13:00), Max: 33.7 (29 Aug 2017 09:08)  T(F): 90.9 (29 Aug 2017 13:00), Max: 92.6 (29 Aug 2017 09:08)  HR: 45 (29 Aug 2017 14:00) (45 - 96)  BP: 102/52 (29 Aug 2017 14:00) (102/52 - 129/69)  BP(mean): 65 (29 Aug 2017 14:00) (65 - 82)  RR: 13 (29 Aug 2017 14:00) (13 - 18)  SpO2: 92% (29 Aug 2017 14:00) (89% - 100%)    Pt started on levo 0.1 in the ED and given vanco/zosyn x1.

## 2017-08-29 NOTE — H&P ADULT - PSH
Brain tumor  1973  Craniotomy  History of cystoscopy  LEFT, stent placement 2015  S/P cystoscopy  right ureteral stent insertion 09/2014, 12/2014, and Jan 2016    now change t every 5 months  S/P Right Hemicolectomy  Open right hemicolectomy in 2007 at [facility]

## 2017-08-29 NOTE — ED ADULT NURSE NOTE - CHIEF COMPLAINT QUOTE
Pt arrives by EMS for witnessed cardiac arrest. Pt arrives with vital signs and intubated. Medical team at bedside. Peripheral access obtained and bloods sent. Will continue to monitor.

## 2017-08-29 NOTE — PROCEDURE NOTE - NSPROCDETAILS_GEN_ALL_CORE
sterile technique, indwelling urinary device inserted/kit not available for this size catheter/prior to placement, an active physician order for the placement of a urinary catheter was verified

## 2017-08-29 NOTE — ED PROVIDER NOTE - ATTENDING CONTRIBUTION TO CARE
MARISABEL Jensen: I have personally performed a face to face bedside history and physical examination of this patient. I have discussed the history, examination, review of systems, assessment and plan of management with the resident. I have reviewed the electronic medical record and amended it to reflect my history, review of systems, physical exam, assessment and plan.

## 2017-08-29 NOTE — H&P ADULT - PROBLEM SELECTOR PLAN 2
-Likely 2/2 to prolonged cardiac arrest. -Likely 2/2 to prolonged cardiac arrest.  -Pt given vanco/zosyn in the ED for possible aspiration PNA. Cultures pending. CT Chest with evidence of b/l consolidations.

## 2017-08-29 NOTE — ED PROVIDER NOTE - OBJECTIVE STATEMENT
77m, h/o carcinoid tumor (metastatic?), seizures, bibems post arrest. Per daughter, she found patient down on his bed, unresponsive, with blood from his mouth.  EMS found patient with no pulse, asystole, did 15 minutes of cpr with 2 epi given, achieved rosc with low bp and put patient on dopamine, intubated by ems. On arrival to ed, patient with confirmed pulse, tube visualized by dl and confirmed in place with blood noted in ooropharynx, suctioned out. ekg done at bedside showed no stemi, fsg in 200s, levophed started. patient hypothermic to 92 deg. patient unresponsive with blown pupils on arrival. Per daughter, has been at baseline state of health, was not acutely ill and had no new complaints. Has chronic loose stools. Last seizure approx 1 year ago.

## 2017-08-29 NOTE — ED ADULT NURSE REASSESSMENT NOTE - NS ED NURSE REASSESS COMMENT FT1
norepinephrine started through 20g in right ac, no difficulty passing catheter, access had positive blood return prior starting drip.  14f criticore indwelling urinary catheter placed, no urinary output, md jefferson aware, pt had elevated creatinine, ultrasound coming to elevate placement. no skin breakdown noted

## 2017-08-29 NOTE — H&P ADULT - NSHPLABSRESULTS_GEN_ALL_CORE
9.4    10.50 )-----------( 289      ( 29 Aug 2017 09:12 )             31.1     08-29    139  |  109<H>  |  39<H>  ----------------------------<  236<H>  4.9   |  4<LL>  |  5.33<H>    Ca    7.0<L>      29 Aug 2017 09:12    TPro  6.4  /  Alb  3.7  /  TBili  0.6  /  DBili  x   /  AST  20  /  ALT  10  /  AlkPhos  122<H>  08-29    CARDIAC MARKERS ( 29 Aug 2017 09:12 )  x     / < 0.06 ng/mL / 226 u/L / 2.76 ng/mL / x            LIVER FUNCTIONS - ( 29 Aug 2017 09:12 )  Alb: 3.7 g/dL / Pro: 6.4 g/dL / ALK PHOS: 122 u/L / ALT: 10 u/L / AST: 20 u/L / GGT: x             PT/INR - ( 29 Aug 2017 09:12 )   PT: 11.8 SEC;   INR: 1.05          PTT - ( 29 Aug 2017 09:12 )  PTT:43.6 SEC

## 2017-08-29 NOTE — H&P ADULT - PROBLEM SELECTOR PLAN 3
-Pt with evidence of metastatic disease on CT to liver and bones. Also has umbilical mass on exam. Ongoing GOC with family.

## 2017-08-29 NOTE — PROCEDURE NOTE - NSURITECHNIQUE_GU_A_CORE
Proper hand hygiene was performed/A sterile drape was used to cover all adjacent areas/The site was cleaned with soap/water and sterile solution (betadine)/The catheter was appropriately lubricated/The catheter was secured with a securement device (e.g. StatLock)/All applicable medical record documentation is completed/Sterile gloves were worn for the duration of the procedure/The urinary drainage system is closed at the end of the procedure/The collection bag is below the level of the patient and urinary bladder

## 2017-08-29 NOTE — ED PROVIDER NOTE - PSH
Brain tumor  1973  Craniotomy  History of cystoscopy  LEFT, stent placement 2015  S/P cystoscopy  right ureteral stent insertion 09/2014, 12/2014, and Jan 2016    now change t every 5 months  S/P Right Hemicolectomy  Open right hemicolectomy in 2007 at [place]

## 2017-08-29 NOTE — CHART NOTE - NSCHARTNOTEFT_GEN_A_CORE
:  KEILA Bar  INDICATION:  Cardiac Arrest  PROCEDURE:  [x ] LIMITED ECHO  [x ] LIMITED CHEST  [ ] LIMITED RETROPERITONEAL  [ ] LIMITED ABDOMINAL  [x ] LIMITED DVT  [ ] NEEDLE GUIDANCE VASCULAR  [ ] NEEDLE GUIDANCE THORACENTESIS  [ ] NEEDLE GUIDANCE PARACENTESIS  [ ] NEEDLE GUIDANCE PERICARDIOCENTESIS  [ ] OTHER    FINDINGS:  LUNGS: B-lines with multiple centimaric consolidation, no PLEFF  CARDIAC: very limited study due to patient specific factors, no LV dysfunction appreciated, no RV dilation, no PEF, IVC < 1.5cm  DVT: CFV, GSV, SFV, PV compressible bilaterally  INTERPRETATION:  Bilateral B-lines with centimeric consolidations, limited cardiac study with no limitation appreciated, no DVT :  KEILA Bar  INDICATION:  Cardiac Arrest, hypoxemia, shock with swollen legs  PROCEDURE:  [x ] LIMITED ECHO  [x ] LIMITED CHEST  [ ] LIMITED RETROPERITONEAL  [ ] LIMITED ABDOMINAL  [x ] LIMITED DVT  [ ] NEEDLE GUIDANCE VASCULAR  [ ] NEEDLE GUIDANCE THORACENTESIS  [ ] NEEDLE GUIDANCE PARACENTESIS  [ ] NEEDLE GUIDANCE PERICARDIOCENTESIS  [ ] OTHER    FINDINGS:  LUNGS: B-lines with multiple centimaric consolidation, no PLEFF  CARDIAC: very limited study due to patient specific factors, no LV dysfunction appreciated, no RV dilation, no PEF, IVC < 1.5cm  DVT: CFV, GSV, SFV, PV compressible bilaterally  INTERPRETATION:  Bilateral B-lines with centimeric consolidations, limited cardiac study with no limitation appreciated, no DVT

## 2017-08-29 NOTE — ED PROVIDER NOTE - MEDICAL DECISION MAKING DETAILS
77m, bibems post asystole arrest with rosc on dopamine. 77m, bibems post asystole arrest with rosc, unresponsive, fixed/dilated pupils, likely poor prognosis, [facility] d/w family, requesting full code at this time. c/w vasopressor support, labs pending,  will give abx as hypothermic with concern for possible aspiration pna. to icu for further management.

## 2017-08-29 NOTE — H&P ADULT - ASSESSMENT
77 M with metastatic carcinoid tumor of colon s/p R hemicolectomy, remote hx of brain tumor s/p resection > 30 years ago, seizure disorder, and HTN BIBEMS after cardiac arrest. Unclear etiology for arrest, although suspect aspiration event.

## 2017-08-29 NOTE — ED PROVIDER NOTE - CRITICAL CARE PROVIDED
consultation with other physicians/interpretation of diagnostic studies/documentation/consult w/ pt's family directly relating to pts condition/direct patient care (not related to procedure)/additional history taking

## 2017-08-29 NOTE — H&P ADULT - PROBLEM SELECTOR PLAN 1
-Coded for 15mins with 2 rounds of epi with ROSC. Pt with dilated fixed pupils and unresponsive and physical/painful stimuli despite being off sedation. CT head with evidence of early anoxic brain injury. Poor prognosis. Ongoing GOC with family.   -Maintain on Levo for now.

## 2017-08-30 LAB
ALBUMIN SERPL ELPH-MCNC: 2.7 G/DL — LOW (ref 3.3–5)
ALP SERPL-CCNC: 90 U/L — SIGNIFICANT CHANGE UP (ref 40–120)
ALT FLD-CCNC: 81 U/L — HIGH (ref 4–41)
APTT BLD: 48.2 SEC — HIGH (ref 27.5–37.4)
AST SERPL-CCNC: 150 U/L — HIGH (ref 4–40)
BASE EXCESS BLDA CALC-SCNC: -19.5 MMOL/L — SIGNIFICANT CHANGE UP
BASE EXCESS BLDA CALC-SCNC: -20.9 MMOL/L — SIGNIFICANT CHANGE UP
BASE EXCESS BLDA CALC-SCNC: -22.7 MMOL/L — SIGNIFICANT CHANGE UP
BASOPHILS # BLD AUTO: 0 K/UL — SIGNIFICANT CHANGE UP (ref 0–0.2)
BASOPHILS NFR BLD AUTO: 0 % — SIGNIFICANT CHANGE UP (ref 0–2)
BILIRUB SERPL-MCNC: 0.6 MG/DL — SIGNIFICANT CHANGE UP (ref 0.2–1.2)
BUN SERPL-MCNC: 46 MG/DL — HIGH (ref 7–23)
CA-I BLDA-SCNC: 0.97 MMOL/L — LOW (ref 1.15–1.29)
CA-I BLDA-SCNC: 1.01 MMOL/L — LOW (ref 1.15–1.29)
CALCIUM SERPL-MCNC: 5.7 MG/DL — CRITICAL LOW (ref 8.4–10.5)
CHLORIDE SERPL-SCNC: 115 MMOL/L — HIGH (ref 98–107)
CK SERPL-CCNC: 1593 U/L — HIGH (ref 30–200)
CO2 SERPL-SCNC: 7 MMOL/L — CRITICAL LOW (ref 22–31)
CREAT SERPL-MCNC: 6.11 MG/DL — HIGH (ref 0.5–1.3)
EOSINOPHIL # BLD AUTO: 0 K/UL — SIGNIFICANT CHANGE UP (ref 0–0.5)
EOSINOPHIL NFR BLD AUTO: 0 % — SIGNIFICANT CHANGE UP (ref 0–6)
GLUCOSE BLDA-MCNC: 262 MG/DL — HIGH (ref 70–99)
GLUCOSE BLDA-MCNC: 297 MG/DL — HIGH (ref 70–99)
GLUCOSE BLDA-MCNC: 34 MG/DL — CRITICAL LOW (ref 70–99)
GLUCOSE SERPL-MCNC: 19 MG/DL — CRITICAL LOW (ref 70–99)
HCO3 BLDA-SCNC: 7 MMOL/L — CRITICAL LOW (ref 22–26)
HCO3 BLDA-SCNC: 8 MMOL/L — CRITICAL LOW (ref 22–26)
HCO3 BLDA-SCNC: 9 MMOL/L — CRITICAL LOW (ref 22–26)
HCT VFR BLD CALC: 27.1 % — LOW (ref 39–50)
HCT VFR BLD CALC: 29.4 % — LOW (ref 39–50)
HCT VFR BLDA CALC: 29.4 % — LOW (ref 39–51)
HCT VFR BLDA CALC: 32 % — LOW (ref 39–51)
HCT VFR BLDA CALC: 32.8 % — LOW (ref 39–51)
HGB BLD-MCNC: 8.2 G/DL — LOW (ref 13–17)
HGB BLD-MCNC: 9 G/DL — LOW (ref 13–17)
HGB BLDA-MCNC: 10.4 G/DL — LOW (ref 13–17)
HGB BLDA-MCNC: 10.6 G/DL — LOW (ref 13–17)
HGB BLDA-MCNC: 9.5 G/DL — LOW (ref 13–17)
IMM GRANULOCYTES # BLD AUTO: 0.01 # — SIGNIFICANT CHANGE UP
IMM GRANULOCYTES NFR BLD AUTO: 0.3 % — SIGNIFICANT CHANGE UP (ref 0–1.5)
INR BLD: 1.24 — HIGH (ref 0.88–1.17)
LACTATE BLDA-SCNC: 3.4 MMOL/L — HIGH (ref 0.5–2)
LACTATE BLDA-SCNC: 4.8 MMOL/L — CRITICAL HIGH (ref 0.5–2)
LYMPHOCYTES # BLD AUTO: 0.32 K/UL — LOW (ref 1–3.3)
LYMPHOCYTES # BLD AUTO: 10 % — LOW (ref 13–44)
MAGNESIUM SERPL-MCNC: 1.6 MG/DL — SIGNIFICANT CHANGE UP (ref 1.6–2.6)
MCHC RBC-ENTMCNC: 25.7 PG — LOW (ref 27–34)
MCHC RBC-ENTMCNC: 26.8 PG — LOW (ref 27–34)
MCHC RBC-ENTMCNC: 30.3 % — LOW (ref 32–36)
MCHC RBC-ENTMCNC: 30.6 % — LOW (ref 32–36)
MCV RBC AUTO: 84 FL — SIGNIFICANT CHANGE UP (ref 80–100)
MCV RBC AUTO: 88.6 FL — SIGNIFICANT CHANGE UP (ref 80–100)
MONOCYTES # BLD AUTO: 0.45 K/UL — SIGNIFICANT CHANGE UP (ref 0–0.9)
MONOCYTES NFR BLD AUTO: 14.1 % — HIGH (ref 2–14)
NEUTROPHILS # BLD AUTO: 2.41 K/UL — SIGNIFICANT CHANGE UP (ref 1.8–7.4)
NEUTROPHILS NFR BLD AUTO: 75.6 % — SIGNIFICANT CHANGE UP (ref 43–77)
NRBC # FLD: 0.23 — SIGNIFICANT CHANGE UP
NRBC # FLD: 0.24 — SIGNIFICANT CHANGE UP
NRBC FLD-RTO: 7.2 — SIGNIFICANT CHANGE UP
NRBC FLD-RTO: 9.6 — SIGNIFICANT CHANGE UP
PCO2 BLDA: 41 MMHG — SIGNIFICANT CHANGE UP (ref 35–48)
PCO2 BLDA: 54 MMHG — HIGH (ref 35–48)
PCO2 BLDA: 54 MMHG — HIGH (ref 35–48)
PH BLDA: 6.82 PH — CRITICAL LOW (ref 7.35–7.45)
PH BLDA: 6.92 PH — CRITICAL LOW (ref 7.35–7.45)
PH BLDA: 6.95 PH — CRITICAL LOW (ref 7.35–7.45)
PHOSPHATE SERPL-MCNC: 7 MG/DL — HIGH (ref 2.5–4.5)
PLATELET # BLD AUTO: 161 K/UL — SIGNIFICANT CHANGE UP (ref 150–400)
PLATELET # BLD AUTO: 177 K/UL — SIGNIFICANT CHANGE UP (ref 150–400)
PMV BLD: 10 FL — SIGNIFICANT CHANGE UP (ref 7–13)
PMV BLD: 9.2 FL — SIGNIFICANT CHANGE UP (ref 7–13)
PO2 BLDA: 142 MMHG — HIGH (ref 83–108)
PO2 BLDA: 32 MMHG — CRITICAL LOW (ref 83–108)
PO2 BLDA: 81 MMHG — LOW (ref 83–108)
POTASSIUM BLDA-SCNC: 4.4 MMOL/L — SIGNIFICANT CHANGE UP (ref 3.4–4.5)
POTASSIUM BLDA-SCNC: 4.6 MMOL/L — HIGH (ref 3.4–4.5)
POTASSIUM BLDA-SCNC: 5.3 MMOL/L — HIGH (ref 3.4–4.5)
POTASSIUM SERPL-MCNC: 5.8 MMOL/L — HIGH (ref 3.5–5.3)
POTASSIUM SERPL-SCNC: 5.8 MMOL/L — HIGH (ref 3.5–5.3)
PROT SERPL-MCNC: 4.9 G/DL — LOW (ref 6–8.3)
PROTHROM AB SERPL-ACNC: 13.9 SEC — HIGH (ref 9.8–13.1)
RBC # BLD: 3.06 M/UL — LOW (ref 4.2–5.8)
RBC # BLD: 3.5 M/UL — LOW (ref 4.2–5.8)
RBC # FLD: 16.9 % — HIGH (ref 10.3–14.5)
RBC # FLD: 16.9 % — HIGH (ref 10.3–14.5)
SAO2 % BLDA: 57.8 % — LOW (ref 95–99)
SAO2 % BLDA: 92.6 % — LOW (ref 95–99)
SAO2 % BLDA: 98.1 % — SIGNIFICANT CHANGE UP (ref 95–99)
SODIUM BLDA-SCNC: 135 MMOL/L — LOW (ref 136–146)
SODIUM BLDA-SCNC: 138 MMOL/L — SIGNIFICANT CHANGE UP (ref 136–146)
SODIUM BLDA-SCNC: 139 MMOL/L — SIGNIFICANT CHANGE UP (ref 136–146)
SODIUM SERPL-SCNC: 142 MMOL/L — SIGNIFICANT CHANGE UP (ref 135–145)
SPECIMEN SOURCE: SIGNIFICANT CHANGE UP
SPECIMEN SOURCE: SIGNIFICANT CHANGE UP
VANCOMYCIN FLD-MCNC: 10.3 UG/ML — SIGNIFICANT CHANGE UP
WBC # BLD: 2.5 K/UL — LOW (ref 3.8–10.5)
WBC # BLD: 3.19 K/UL — LOW (ref 3.8–10.5)
WBC # FLD AUTO: 2.5 K/UL — LOW (ref 3.8–10.5)
WBC # FLD AUTO: 3.19 K/UL — LOW (ref 3.8–10.5)

## 2017-08-30 PROCEDURE — 99291 CRITICAL CARE FIRST HOUR: CPT

## 2017-08-30 RX ORDER — CHLORHEXIDINE GLUCONATE 213 G/1000ML
1 SOLUTION TOPICAL DAILY
Qty: 0 | Refills: 0 | Status: DISCONTINUED | OUTPATIENT
Start: 2017-08-30 | End: 2017-08-31

## 2017-08-30 RX ORDER — SODIUM BICARBONATE 1 MEQ/ML
50 SYRINGE (ML) INTRAVENOUS ONCE
Qty: 0 | Refills: 0 | Status: COMPLETED | OUTPATIENT
Start: 2017-08-30 | End: 2017-08-30

## 2017-08-30 RX ORDER — DEXTROSE 50 % IN WATER 50 %
50 SYRINGE (ML) INTRAVENOUS ONCE
Qty: 0 | Refills: 0 | Status: COMPLETED | OUTPATIENT
Start: 2017-08-30 | End: 2017-08-30

## 2017-08-30 RX ORDER — CALCIUM GLUCONATE 100 MG/ML
2 VIAL (ML) INTRAVENOUS ONCE
Qty: 2 | Refills: 0 | Status: COMPLETED | OUTPATIENT
Start: 2017-08-30 | End: 2017-08-30

## 2017-08-30 RX ORDER — SODIUM BICARBONATE 1 MEQ/ML
0.28 SYRINGE (ML) INTRAVENOUS
Qty: 150 | Refills: 0 | Status: DISCONTINUED | OUTPATIENT
Start: 2017-08-30 | End: 2017-08-31

## 2017-08-30 RX ORDER — DEXTROSE 10 % IN WATER 10 %
1000 INTRAVENOUS SOLUTION INTRAVENOUS
Qty: 0 | Refills: 0 | Status: DISCONTINUED | OUTPATIENT
Start: 2017-08-30 | End: 2017-08-31

## 2017-08-30 RX ADMIN — Medication 200 GRAM(S): at 09:57

## 2017-08-30 RX ADMIN — Medication 1 APPLICATION(S): at 17:49

## 2017-08-30 RX ADMIN — Medication 75 MILLILITER(S): at 10:20

## 2017-08-30 RX ADMIN — LEVETIRACETAM 400 MILLIGRAM(S): 250 TABLET, FILM COATED ORAL at 06:38

## 2017-08-30 RX ADMIN — Medication 11.25 MICROGRAM(S)/KG/MIN: at 19:48

## 2017-08-30 RX ADMIN — CHLORHEXIDINE GLUCONATE 1 APPLICATION(S): 213 SOLUTION TOPICAL at 12:12

## 2017-08-30 RX ADMIN — Medication 100 MEQ/KG/HR: at 09:57

## 2017-08-30 RX ADMIN — Medication 50 MILLIEQUIVALENT(S): at 10:20

## 2017-08-30 RX ADMIN — Medication 11.25 MICROGRAM(S)/KG/MIN: at 09:58

## 2017-08-30 RX ADMIN — Medication 75 MILLILITER(S): at 17:50

## 2017-08-30 RX ADMIN — Medication 11.25 MICROGRAM(S)/KG/MIN: at 08:57

## 2017-08-30 RX ADMIN — Medication 11.25 MICROGRAM(S)/KG/MIN: at 17:50

## 2017-08-30 RX ADMIN — PIPERACILLIN AND TAZOBACTAM 25 GRAM(S): 4; .5 INJECTION, POWDER, LYOPHILIZED, FOR SOLUTION INTRAVENOUS at 17:49

## 2017-08-30 RX ADMIN — Medication 50 MILLILITER(S): at 05:34

## 2017-08-30 RX ADMIN — Medication 75 MILLILITER(S): at 19:47

## 2017-08-30 RX ADMIN — Medication 100 MEQ/KG/HR: at 19:47

## 2017-08-30 RX ADMIN — Medication 50 MILLIEQUIVALENT(S): at 12:00

## 2017-08-30 RX ADMIN — LEVETIRACETAM 400 MILLIGRAM(S): 250 TABLET, FILM COATED ORAL at 17:49

## 2017-08-30 RX ADMIN — PIPERACILLIN AND TAZOBACTAM 25 GRAM(S): 4; .5 INJECTION, POWDER, LYOPHILIZED, FOR SOLUTION INTRAVENOUS at 06:38

## 2017-08-30 RX ADMIN — Medication 50 MILLILITER(S): at 10:25

## 2017-08-30 RX ADMIN — HEPARIN SODIUM 5000 UNIT(S): 5000 INJECTION INTRAVENOUS; SUBCUTANEOUS at 21:32

## 2017-08-30 RX ADMIN — HEPARIN SODIUM 5000 UNIT(S): 5000 INJECTION INTRAVENOUS; SUBCUTANEOUS at 13:32

## 2017-08-30 RX ADMIN — Medication 11.25 MICROGRAM(S)/KG/MIN: at 14:00

## 2017-08-30 RX ADMIN — Medication 100 MEQ/KG/HR: at 17:50

## 2017-08-30 RX ADMIN — Medication 50 MILLILITER(S): at 10:10

## 2017-08-30 RX ADMIN — HEPARIN SODIUM 5000 UNIT(S): 5000 INJECTION INTRAVENOUS; SUBCUTANEOUS at 06:37

## 2017-08-30 NOTE — PROGRESS NOTE ADULT - SUBJECTIVE AND OBJECTIVE BOX
CHIEF COMPLAINT:    Interval Events:    REVIEW OF SYSTEMS:  Constitutional: [ ] negative [ ] fevers [ ] chills [ ] weight loss [ ] weight gain  HEENT: [ ] negative [ ] dry eyes [ ] eye irritation [ ] postnasal drip [ ] nasal congestion  CV: [ ] negative  [ ] chest pain [ ] orthopnea [ ] palpitations [ ] murmur  Resp: [ ] negative [ ] cough [ ] shortness of breath [ ] dyspnea [ ] wheezing [ ] sputum [ ] hemoptysis  GI: [ ] negative [ ] nausea [ ] vomiting [ ] diarrhea [ ] constipation [ ] abd pain [ ] dysphagia   : [ ] negative [ ] dysuria [ ] nocturia [ ] hematuria [ ] increased urinary frequency  Musculoskeletal: [ ] negative [ ] back pain [ ] myalgias [ ] arthralgias [ ] fracture  Skin: [ ] negative [ ] rash [ ] itch  Neurological: [ ] negative [ ] headache [ ] dizziness [ ] syncope [ ] weakness [ ] numbness  Psychiatric: [ ] negative [ ] anxiety [ ] depression  Endocrine: [ ] negative [ ] diabetes [ ] thyroid problem  Hematologic/Lymphatic: [ ] negative [ ] anemia [ ] bleeding problem  Allergic/Immunologic: [ ] negative [ ] itchy eyes [ ] nasal discharge [ ] hives [ ] angioedema  [ ] All other systems negative  [ ] Unable to assess ROS because ________    OBJECTIVE:  ICU Vital Signs Last 24 Hrs  T(C): 35.6 (30 Aug 2017 04:00), Max: 36.4 (29 Aug 2017 20:00)  T(F): 96 (30 Aug 2017 04:00), Max: 97.6 (29 Aug 2017 20:00)  HR: 59 (30 Aug 2017 06:00) (45 - 96)  BP: 84/50 (30 Aug 2017 06:00) (73/42 - 129/69)  BP(mean): 56 (30 Aug 2017 06:00) (49 - 82)  ABP: --  ABP(mean): --  RR: 18 (30 Aug 2017 06:00) (13 - 19)  SpO2: 96% (30 Aug 2017 06:00) (89% - 100%)    Mode: AC/ CMV (Assist Control/ Continuous Mandatory Ventilation), RR (machine): 16, TV (machine): 350, FiO2: 100, PEEP: 5, PS: , ITime: 0.8, MAP: 9.7, PIP: 20    08-29 @ 07:01  -  08-30 @ 06:24  --------------------------------------------------------  IN: 2704.3 mL / OUT: 240 mL / NET: 2464.3 mL      CAPILLARY BLOOD GLUCOSE  14 (30 Aug 2017 05:28)          PHYSICAL EXAM:  General:   HEENT:   Lymph Nodes:  Neck:   Respiratory:   Cardiovascular:   Abdomen:   Extremities:   Skin:   Neurological:  Psychiatry:    LINES:    HOSPITAL MEDICATIONS:  heparin  Injectable 5000 Unit(s) SubCutaneous every 8 hours    piperacillin/tazobactam IVPB. 3.375 Gram(s) IV Intermittent every 12 hours    norepinephrine Infusion 0.1 MICROgram(s)/kG/Min IV Continuous <Continuous>        fentaNYL   Infusion 1.5 MICROgram(s)/kG/Hr IV Continuous <Continuous>  levETIRAcetam  IVPB 250 milliGRAM(s) IV Intermittent every 12 hours              chlorhexidine 4% Liquid 1 Application(s) Topical daily            LABS:                        8.2    3.19  )-----------( 177      ( 30 Aug 2017 04:03 )             27.1     Hgb Trend: 8.2<--, 9.4<--  08-30    142  |  115<H>  |  46<H>  ----------------------------<  19<LL>  5.8<H>   |  7<LL>  |  6.11<H>    Ca    5.7<LL>      30 Aug 2017 04:03  Phos  7.0     08-30  Mg     1.6     08-30    TPro  4.9<L>  /  Alb  2.7<L>  /  TBili  0.6  /  DBili  x   /  AST  150<H>  /  ALT  81<H>  /  AlkPhos  90  08-30    Creatinine Trend: 6.11<--, 5.33<--  PT/INR - ( 30 Aug 2017 04:03 )   PT: 13.9 SEC;   INR: 1.24          PTT - ( 30 Aug 2017 04:03 )  PTT:48.2 SEC      Venous Blood Gas:  08-29 @ 09:12  < 6.81/55/212/5/96.6  VBG Lactate: 10.5      MICROBIOLOGY:     RADIOLOGY:  [ ] Reviewed and interpreted by me    EKG: CHIEF COMPLAINT: s/p cardiac arrest, anoxic brain injury     Interval Events: Tm: 36.4C. fingerstick glucose 12, pt given 1 amp D50. Pt given 1 bolus NS for low blood pressure.     REVIEW OF SYSTEMS:  Constitutional: [ ] negative [ ] fevers [ ] chills [ ] weight loss [ ] weight gain  HEENT: [ ] negative [ ] dry eyes [ ] eye irritation [ ] postnasal drip [ ] nasal congestion  CV: [ ] negative  [ ] chest pain [ ] orthopnea [ ] palpitations [ ] murmur  Resp: [ ] negative [ ] cough [ ] shortness of breath [ ] dyspnea [ ] wheezing [ ] sputum [ ] hemoptysis  GI: [ ] negative [ ] nausea [ ] vomiting [ ] diarrhea [ ] constipation [ ] abd pain [ ] dysphagia   : [ ] negative [ ] dysuria [ ] nocturia [ ] hematuria [ ] increased urinary frequency  Musculoskeletal: [ ] negative [ ] back pain [ ] myalgias [ ] arthralgias [ ] fracture  Skin: [ ] negative [ ] rash [ ] itch  Neurological: [ ] negative [ ] headache [ ] dizziness [ ] syncope [ ] weakness [ ] numbness  Psychiatric: [ ] negative [ ] anxiety [ ] depression  Endocrine: [ ] negative [ ] diabetes [ ] thyroid problem  Hematologic/Lymphatic: [ ] negative [ ] anemia [ ] bleeding problem  Allergic/Immunologic: [ ] negative [ ] itchy eyes [ ] nasal discharge [ ] hives [ ] angioedema  [ ] All other systems negative  [ ] Unable to assess ROS because __intubated and sedated______    OBJECTIVE:  ICU Vital Signs Last 24 Hrs  T(C): 35.6 (30 Aug 2017 04:00), Max: 36.4 (29 Aug 2017 20:00)  T(F): 96 (30 Aug 2017 04:00), Max: 97.6 (29 Aug 2017 20:00)  HR: 59 (30 Aug 2017 06:00) (45 - 96)  BP: 84/50 (30 Aug 2017 06:00) (73/42 - 129/69)  BP(mean): 56 (30 Aug 2017 06:00) (49 - 82)  ABP: --  ABP(mean): --  RR: 18 (30 Aug 2017 06:00) (13 - 19)  SpO2: 96% (30 Aug 2017 06:00) (89% - 100%)    Mode: AC/ CMV (Assist Control/ Continuous Mandatory Ventilation), RR (machine): 16, TV (machine): 350, FiO2: 100, PEEP: 5, PS: , ITime: 0.8, MAP: 9.7, PIP: 20    08-29 @ 07:01  -  08-30 @ 06:24  --------------------------------------------------------  IN: 2704.3 mL / OUT: 240 mL / NET: 2464.3 mL      CAPILLARY BLOOD GLUCOSE  14 (30 Aug 2017 05:28)          PHYSICAL EXAM:  General: pt intubated and sedated  HEENT:   Lymph Nodes:  Neck:   Respiratory: pt on vent: RR: 16, RV: 350, PEEP: 5, FiO2: 100; coarse lung sounds bilat anterior lung fields  Cardiovascular: RRR, nl S1, S2, no S3, S4; no murmurs/rubs/gallops  Abdomen: ND/NT, NABS x 4  Extremities: no peripheral edema   Skin:   Neurological:  Psychiatry:    LINES:  ruth catheter: 20 mL bloody output     HOSPITAL MEDICATIONS:  heparin  Injectable 5000 Unit(s) SubCutaneous every 8 hours    piperacillin/tazobactam IVPB. 3.375 Gram(s) IV Intermittent every 12 hours    norepinephrine Infusion 0.1 MICROgram(s)/kG/Min IV Continuous <Continuous>        fentaNYL   Infusion 1.5 MICROgram(s)/kG/Hr IV Continuous <Continuous>  levETIRAcetam  IVPB 250 milliGRAM(s) IV Intermittent every 12 hours              chlorhexidine 4% Liquid 1 Application(s) Topical daily            LABS:                        8.2    3.19  )-----------( 177      ( 30 Aug 2017 04:03 )             27.1     Hgb Trend: 8.2<--, 9.4<--  08-30    142  |  115<H>  |  46<H>  ----------------------------<  19<LL>  5.8<H>   |  7<LL>  |  6.11<H>    Ca    5.7<LL>      30 Aug 2017 04:03  Phos  7.0     08-30  Mg     1.6     08-30    TPro  4.9<L>  /  Alb  2.7<L>  /  TBili  0.6  /  DBili  x   /  AST  150<H>  /  ALT  81<H>  /  AlkPhos  90  08-30    Creatinine Trend: 6.11<--, 5.33<--  PT/INR - ( 30 Aug 2017 04:03 )   PT: 13.9 SEC;   INR: 1.24          PTT - ( 30 Aug 2017 04:03 )  PTT:48.2 SEC      Venous Blood Gas:  08-29 @ 09:12  < 6.81/55/212/5/96.6  VBG Lactate: 10.5      MICROBIOLOGY:     RADIOLOGY:  [ ] Reviewed and interpreted by me    EKG: CHIEF COMPLAINT: s/p cardiac arrest, anoxic brain injury     Interval Events: Tm: 36.4C. fingerstick glucose 12, pt given 1 amp D50. Pt given 1 bolus NS for low blood pressure.     REVIEW OF SYSTEMS:  Constitutional: [ ] negative [ ] fevers [ ] chills [ ] weight loss [ ] weight gain  HEENT: [ ] negative [ ] dry eyes [ ] eye irritation [ ] postnasal drip [ ] nasal congestion  CV: [ ] negative  [ ] chest pain [ ] orthopnea [ ] palpitations [ ] murmur  Resp: [ ] negative [ ] cough [ ] shortness of breath [ ] dyspnea [ ] wheezing [ ] sputum [ ] hemoptysis  GI: [ ] negative [ ] nausea [ ] vomiting [ ] diarrhea [ ] constipation [ ] abd pain [ ] dysphagia   : [ ] negative [ ] dysuria [ ] nocturia [ ] hematuria [ ] increased urinary frequency  Musculoskeletal: [ ] negative [ ] back pain [ ] myalgias [ ] arthralgias [ ] fracture  Skin: [ ] negative [ ] rash [ ] itch  Neurological: [ ] negative [ ] headache [ ] dizziness [ ] syncope [ ] weakness [ ] numbness  Psychiatric: [ ] negative [ ] anxiety [ ] depression  Endocrine: [ ] negative [ ] diabetes [ ] thyroid problem  Hematologic/Lymphatic: [ ] negative [ ] anemia [ ] bleeding problem  Allergic/Immunologic: [ ] negative [ ] itchy eyes [ ] nasal discharge [ ] hives [ ] angioedema  [ ] All other systems negative  [ ] Unable to assess ROS because __intubated and sedated______    OBJECTIVE:  ICU Vital Signs Last 24 Hrs  T(C): 35.6 (30 Aug 2017 04:00), Max: 36.4 (29 Aug 2017 20:00)  T(F): 96 (30 Aug 2017 04:00), Max: 97.6 (29 Aug 2017 20:00)  HR: 59 (30 Aug 2017 06:00) (45 - 96)  BP: 84/50 (30 Aug 2017 06:00) (73/42 - 129/69)  BP(mean): 56 (30 Aug 2017 06:00) (49 - 82)  ABP: --  ABP(mean): --  RR: 18 (30 Aug 2017 06:00) (13 - 19)  SpO2: 96% (30 Aug 2017 06:00) (89% - 100%)    Mode: AC/ CMV (Assist Control/ Continuous Mandatory Ventilation), RR (machine): 16, TV (machine): 350, FiO2: 100, PEEP: 5, PS: , ITime: 0.8, MAP: 9.7, PIP: 20    08-29 @ 07:01  -  08-30 @ 06:24  --------------------------------------------------------  IN: 2704.3 mL / OUT: 240 mL / NET: 2464.3 mL      CAPILLARY BLOOD GLUCOSE  14 (30 Aug 2017 05:28)          PHYSICAL EXAM:  General: pt intubated and sedated  HEENT:   Lymph Nodes:  Neck:   Respiratory: pt on vent: RR: 16, RV: 350, PEEP: 5, FiO2: 100; coarse lung sounds bilat anterior lung fields  Cardiovascular: RRR, nl S1, S2, no S3, S4; no murmurs/rubs/gallops  Abdomen: 6 x 6 in mass appreciated lateral to umbilicus on left side. no overlying erythema or necrosis, no open wound. ND/NT, NABS x 4  Extremities: no peripheral edema   Skin:   Neurological:  Psychiatry:    LINES:  ruth catheter: 20 mL bloody output     HOSPITAL MEDICATIONS:  heparin  Injectable 5000 Unit(s) SubCutaneous every 8 hours    piperacillin/tazobactam IVPB. 3.375 Gram(s) IV Intermittent every 12 hours    norepinephrine Infusion 0.1 MICROgram(s)/kG/Min IV Continuous <Continuous>        fentaNYL   Infusion 1.5 MICROgram(s)/kG/Hr IV Continuous <Continuous>  levETIRAcetam  IVPB 250 milliGRAM(s) IV Intermittent every 12 hours              chlorhexidine 4% Liquid 1 Application(s) Topical daily            LABS:                        8.2    3.19  )-----------( 177      ( 30 Aug 2017 04:03 )             27.1     Hgb Trend: 8.2<--, 9.4<--  08-30    142  |  115<H>  |  46<H>  ----------------------------<  19<LL>  5.8<H>   |  7<LL>  |  6.11<H>    Ca    5.7<LL>      30 Aug 2017 04:03  Phos  7.0     08-30  Mg     1.6     08-30    TPro  4.9<L>  /  Alb  2.7<L>  /  TBili  0.6  /  DBili  x   /  AST  150<H>  /  ALT  81<H>  /  AlkPhos  90  08-30    Creatinine Trend: 6.11<--, 5.33<--  PT/INR - ( 30 Aug 2017 04:03 )   PT: 13.9 SEC;   INR: 1.24          PTT - ( 30 Aug 2017 04:03 )  PTT:48.2 SEC      Venous Blood Gas:  08-29 @ 09:12  < 6.81/55/212/5/96.6  VBG Lactate: 10.5      MICROBIOLOGY:     RADIOLOGY:  [ ] Reviewed and interpreted by me    EKG:

## 2017-08-30 NOTE — PROGRESS NOTE ADULT - ASSESSMENT
· Assessment		  77 M with metastatic carcinoid tumor of colon s/p R hemicolectomy, remote hx of brain tumor s/p resection > 30 years ago, seizure disorder, and HTN BIBEMS after cardiac arrest. Unclear etiology for arrest, although suspect aspiration event.     Problem/Plan - 1:  ·  Problem: Cardiac arrest.  Plan: -Coded for 15mins with 2 rounds of epi with ROSC. Pt with dilated fixed pupils and unresponsive and physical/painful stimuli despite being off sedation. CT head with evidence of early anoxic brain injury. Poor prognosis. Ongoing GOC with family.   -Maintain on Levo for now.     Problem/Plan - 2:  ·  Problem: Hypothermia, initial encounter.  Plan: -Likely 2/2 to prolonged cardiac arrest.  -Pt given vanco/zosyn in the ED for possible aspiration PNA. Cultures pending. CT Chest with evidence of b/l consolidations.-Likely 2/2 to prolonged cardiac arrest..     Problem/Plan - 3:  ·  Problem: Carcinoid tumor.  Plan:Plan: -Pt with evidence of metastatic disease on CT to liver and bones. Also has umbilical mass on exam. Ongoing GOC with family..     Problem/Plan - 4:  ·  Problem: Seizure disorder.  Plan:Plan: -c/w keppra 250mg BID..     Problem/Plan - 5:  ·  Problem: Need for prophylactic measure.  Plan:Plan: -HSQ  -NPO. · Assessment		  77 M with metastatic carcinoid tumor of colon s/p R hemicolectomy, remote hx of brain tumor s/p resection > 30 years ago, seizure disorder, and HTN BIBEMS after cardiac arrest. Unclear etiology for arrest, although suspect aspiration event.     NEURO  #anoxic brain injury. Pt with dilated fixed pupils and unresponsive and physical/painful stimuli despite being off sedation. CT head with evidence of early anoxic brain injury. Poor prognosis.   - consider palliative care / hospice consult  - ongoing GOC with family     #Seizure disorder.    - c/w keppra 250mg BID.  - NPO    CARDIOVASCULAR  #Cardiac arrest. Coded for 15mins with 2 rounds of epi with ROSC.-Ongoing GOC with family.   -Maintain on Levo for now.     #anemia. pt had traumatic bleeding from mouth post CPR yesterday. no active bleeding noted today.   - continue to monitor     PULM  #possible aspiration PNA. CT Chest with evidence of b/l consolidations.-Likely 2/2 to prolonged cardiac arrest..   - blood cx shows no growth at 24 hours  - tx with vanco/zosyn (started 8/29)    ENDO  #Carcinoid tumor. with evidence of metastatic disease on CT to liver and bones. Also has umbilical mass on exam. - - Ongoing GOC with family..       PPX  #DVT ppx  Plan:-HSQ

## 2017-08-30 NOTE — PROVIDER CONTACT NOTE (CRITICAL VALUE NOTIFICATION) - BACKGROUND
Intubated on Mechanical ventilator, ABG PH 6.8 On Sodium bicarb  and Levophed drips. On Antibiotics for aspiration PNA?

## 2017-08-30 NOTE — CHART NOTE - NSCHARTNOTEFT_GEN_A_CORE
:  KEILA Bar  INDICATION:  Cardiac Arrest  PROCEDURE:  [x ] LIMITED ECHO  [ x] LIMITED CHEST  [ x] LIMITED RETROPERITONEAL  [ x] LIMITED ABDOMINAL  [ x] LIMITED DVT  [ ] NEEDLE GUIDANCE VASCULAR  [ ] NEEDLE GUIDANCE THORACENTESIS  [ ] NEEDLE GUIDANCE PARACENTESIS  [ ] NEEDLE GUIDANCE PERICARDIOCENTESIS  [ ] OTHER    FINDINGS:  LUNGS: scattered B-lines, left anechoic PLEFF with adjacent atelectasis  CARDIAC: normal LVF, no RV dilation no PEF, IVC collapsable   RETROPERITONEAL: Mild right hydronephrosis with cortical thinning, hyperechoic renal pelvicaliceal region, Left kidney hyperechoic   ABDOMEN: small anechoic ascites, no gut sliding, distended with intraluminal feces  INTERPRETATION:  scaterred B-lines, left PLEFF, no cardiac limitation, right hydronephrosis, chronic kidney disease pattern, small ascites, possible ileus. :  KEILA Bar  INDICATION:  Cardiac Arrest  PROCEDURE:  [x ] LIMITED ECHO  [ x] LIMITED CHEST  [ x] LIMITED RETROPERITONEAL  [ x] LIMITED ABDOMINAL  [ x] LIMITED DVT  [ ] NEEDLE GUIDANCE VASCULAR  [ ] NEEDLE GUIDANCE THORACENTESIS  [ ] NEEDLE GUIDANCE PARACENTESIS  [ ] NEEDLE GUIDANCE PERICARDIOCENTESIS  [ ] OTHER    FINDINGS:  LUNGS: scattered B-lines, left anechoic PLEFF with adjacent atelectasis  CARDIAC: normal LVF, no RV dilation no PEF, IVC collapsable   RETROPERITONEAL: Mild right hydronephrosis with cortical thinning, hyperechoic renal pelvicaliceal region, Left kidney hyperechoic   ABDOMEN: small anechoic ascites, no gut sliding, distended with intraluminal feces  INTERPRETATION:  scaterred B-lines, left PLEFF, no cardiac limitation, right hydronephrosis, chronic kidney disease pattern, small ascites, abdominal findings correlates with CT findings

## 2017-08-31 VITALS — OXYGEN SATURATION: 91 %

## 2017-08-31 LAB
ALBUMIN SERPL ELPH-MCNC: 2.2 G/DL — LOW (ref 3.3–5)
ALP SERPL-CCNC: 57 U/L — SIGNIFICANT CHANGE UP (ref 40–120)
ALT FLD-CCNC: 114 U/L — HIGH (ref 4–41)
AST SERPL-CCNC: 349 U/L — HIGH (ref 4–40)
BILIRUB SERPL-MCNC: 0.9 MG/DL — SIGNIFICANT CHANGE UP (ref 0.2–1.2)
BUN SERPL-MCNC: 49 MG/DL — HIGH (ref 7–23)
CALCIUM SERPL-MCNC: 5 MG/DL — CRITICAL LOW (ref 8.4–10.5)
CHLORIDE SERPL-SCNC: 106 MMOL/L — SIGNIFICANT CHANGE UP (ref 98–107)
CO2 SERPL-SCNC: 10 MMOL/L — CRITICAL LOW (ref 22–31)
CREAT SERPL-MCNC: 6.39 MG/DL — HIGH (ref 0.5–1.3)
GLUCOSE SERPL-MCNC: 143 MG/DL — HIGH (ref 70–99)
HCT VFR BLD CALC: 33.9 % — LOW (ref 39–50)
HGB BLD-MCNC: 10.8 G/DL — LOW (ref 13–17)
MAGNESIUM SERPL-MCNC: 1.7 MG/DL — SIGNIFICANT CHANGE UP (ref 1.6–2.6)
MCHC RBC-ENTMCNC: 26.1 PG — LOW (ref 27–34)
MCHC RBC-ENTMCNC: 31.9 % — LOW (ref 32–36)
MCV RBC AUTO: 81.9 FL — SIGNIFICANT CHANGE UP (ref 80–100)
NRBC # FLD: 0.39 — SIGNIFICANT CHANGE UP
NRBC FLD-RTO: 9.8 — SIGNIFICANT CHANGE UP
PHOSPHATE SERPL-MCNC: 6.7 MG/DL — HIGH (ref 2.5–4.5)
PLATELET # BLD AUTO: 120 K/UL — LOW (ref 150–400)
PMV BLD: 10.2 FL — SIGNIFICANT CHANGE UP (ref 7–13)
POTASSIUM SERPL-MCNC: 4.5 MMOL/L — SIGNIFICANT CHANGE UP (ref 3.5–5.3)
POTASSIUM SERPL-SCNC: 4.5 MMOL/L — SIGNIFICANT CHANGE UP (ref 3.5–5.3)
PROT SERPL-MCNC: 4.4 G/DL — LOW (ref 6–8.3)
RBC # BLD: 4.14 M/UL — LOW (ref 4.2–5.8)
RBC # FLD: 16.8 % — HIGH (ref 10.3–14.5)
SODIUM SERPL-SCNC: 140 MMOL/L — SIGNIFICANT CHANGE UP (ref 135–145)
VANCOMYCIN TROUGH SERPL-MCNC: 10.1 UG/ML — SIGNIFICANT CHANGE UP (ref 10–20)
WBC # BLD: 3.97 K/UL — SIGNIFICANT CHANGE UP (ref 3.8–10.5)
WBC # FLD AUTO: 3.97 K/UL — SIGNIFICANT CHANGE UP (ref 3.8–10.5)

## 2017-08-31 RX ORDER — CALCIUM GLUCONATE 100 MG/ML
2 VIAL (ML) INTRAVENOUS ONCE
Qty: 2 | Refills: 0 | Status: COMPLETED | OUTPATIENT
Start: 2017-08-31 | End: 2017-08-31

## 2017-08-31 RX ADMIN — Medication 100 MEQ/KG/HR: at 05:22

## 2017-08-31 RX ADMIN — Medication 200 GRAM(S): at 05:45

## 2017-08-31 RX ADMIN — Medication 1 APPLICATION(S): at 05:22

## 2017-08-31 RX ADMIN — LEVETIRACETAM 400 MILLIGRAM(S): 250 TABLET, FILM COATED ORAL at 05:22

## 2017-08-31 RX ADMIN — HEPARIN SODIUM 5000 UNIT(S): 5000 INJECTION INTRAVENOUS; SUBCUTANEOUS at 05:22

## 2017-08-31 NOTE — PROGRESS NOTE ADULT - SUBJECTIVE AND OBJECTIVE BOX
CHIEF COMPLAINT:    Interval Events:    REVIEW OF SYSTEMS:  Constitutional: [ ] negative [ ] fevers [ ] chills [ ] weight loss [ ] weight gain  HEENT: [ ] negative [ ] dry eyes [ ] eye irritation [ ] postnasal drip [ ] nasal congestion  CV: [ ] negative  [ ] chest pain [ ] orthopnea [ ] palpitations [ ] murmur  Resp: [ ] negative [ ] cough [ ] shortness of breath [ ] dyspnea [ ] wheezing [ ] sputum [ ] hemoptysis  GI: [ ] negative [ ] nausea [ ] vomiting [ ] diarrhea [ ] constipation [ ] abd pain [ ] dysphagia   : [ ] negative [ ] dysuria [ ] nocturia [ ] hematuria [ ] increased urinary frequency  Musculoskeletal: [ ] negative [ ] back pain [ ] myalgias [ ] arthralgias [ ] fracture  Skin: [ ] negative [ ] rash [ ] itch  Neurological: [ ] negative [ ] headache [ ] dizziness [ ] syncope [ ] weakness [ ] numbness  Psychiatric: [ ] negative [ ] anxiety [ ] depression  Endocrine: [ ] negative [ ] diabetes [ ] thyroid problem  Hematologic/Lymphatic: [ ] negative [ ] anemia [ ] bleeding problem  Allergic/Immunologic: [ ] negative [ ] itchy eyes [ ] nasal discharge [ ] hives [ ] angioedema  [ ] All other systems negative  [ ] Unable to assess ROS because ________    OBJECTIVE:  ICU Vital Signs Last 24 Hrs  T(C): 35 (31 Aug 2017 04:00), Max: 35.8 (30 Aug 2017 08:00)  T(F): 95 (31 Aug 2017 04:00), Max: 96.5 (30 Aug 2017 08:00)  HR: 108 (31 Aug 2017 07:00) (0 - 108)  BP: 105/56 (31 Aug 2017 07:00) (90/51 - 185/56)  BP(mean): 67 (31 Aug 2017 07:00) (59 - 91)  ABP: --  ABP(mean): --  RR: 28 (31 Aug 2017 07:00) (5 - 31)  SpO2: 92% (31 Aug 2017 07:00) (63% - 100%)    Mode: AC/ CMV (Assist Control/ Continuous Mandatory Ventilation), RR (machine): 28, TV (machine): 450, FiO2: 100, PEEP: 8, MAP: 20, PIP: 36    08-30 @ 07:01  -  08-31 @ 07:00  --------------------------------------------------------  IN: 5728.1 mL / OUT: 27 mL / NET: 5701.1 mL      CAPILLARY BLOOD GLUCOSE  139 (31 Aug 2017 05:33)          PHYSICAL EXAM:  General:   HEENT:   Lymph Nodes:  Neck:   Respiratory:   Cardiovascular:   Abdomen:   Extremities:   Skin:   Neurological:  Psychiatry:    LINES:    HOSPITAL MEDICATIONS:  heparin  Injectable 5000 Unit(s) SubCutaneous every 8 hours    piperacillin/tazobactam IVPB. 3.375 Gram(s) IV Intermittent every 12 hours    norepinephrine Infusion 0.1 MICROgram(s)/kG/Min IV Continuous <Continuous>        levETIRAcetam  IVPB 250 milliGRAM(s) IV Intermittent every 12 hours          sodium bicarbonate  Infusion 0.28 mEq/kG/Hr IV Continuous <Continuous>  dextrose 10%. 1000 milliLiter(s) IV Continuous <Continuous>      chlorhexidine 4% Liquid 1 Application(s) Topical daily  petrolatum Ophthalmic Ointment 1 Application(s) Both EYES two times a day            LABS:                        10.8   3.97  )-----------( 120      ( 31 Aug 2017 03:50 )             33.9     Hgb Trend: 10.8<--, 9.0<--, 8.2<--, 9.4<--  08-31    140  |  106  |  49<H>  ----------------------------<  143<H>  4.5   |  10<LL>  |  6.39<H>    Ca    5.0<LL>      31 Aug 2017 03:50  Phos  6.7     08-31  Mg     1.7     08-31    TPro  4.4<L>  /  Alb  2.2<L>  /  TBili  0.9  /  DBili  x   /  AST  349<H>  /  ALT  114<H>  /  AlkPhos  57  08-31    Creatinine Trend: 6.39<--, 6.11<--, 5.33<--  PT/INR - ( 30 Aug 2017 04:03 )   PT: 13.9 SEC;   INR: 1.24          PTT - ( 30 Aug 2017 04:03 )  PTT:48.2 SEC    Arterial Blood Gas:  08-30 @ 15:47  6.92/54/32/9/57.8/-19.5  ABG lactate: 4.8  Arterial Blood Gas:  08-30 @ 11:00  6.95/41/142/8/98.1/-20.9  ABG lactate: 3.4  Arterial Blood Gas:  08-30 @ 09:35  6.82/54/81/7/92.6/-22.7  ABG lactate: --    Venous Blood Gas:  08-29 @ 09:12  < 6.81/55/212/5/96.6  VBG Lactate: 10.5      MICROBIOLOGY:     RADIOLOGY:  [ ] Reviewed and interpreted by me    EKG: CHIEF COMPLAINT: coded at home, ROSC with CPR    Interval Events: Coded before rounds. No ROSC with CPR, epi. pronounced before rounds.      OBJECTIVE:  ICU Vital Signs Last 24 Hrs  T(C): 35 (31 Aug 2017 04:00), Max: 35.8 (30 Aug 2017 08:00)  T(F): 95 (31 Aug 2017 04:00), Max: 96.5 (30 Aug 2017 08:00)  HR: 108 (31 Aug 2017 07:00) (0 - 108)  BP: 105/56 (31 Aug 2017 07:00) (90/51 - 185/56)  BP(mean): 67 (31 Aug 2017 07:00) (59 - 91)  ABP: --  ABP(mean): --  RR: 28 (31 Aug 2017 07:00) (5 - 31)  SpO2: 92% (31 Aug 2017 07:00) (63% - 100%)    Mode: AC/ CMV (Assist Control/ Continuous Mandatory Ventilation), RR (machine): 28, TV (machine): 450, FiO2: 100, PEEP: 8, MAP: 20, PIP: 36    08-30 @ 07:01  -  08-31 @ 07:00  --------------------------------------------------------  IN: 5728.1 mL / OUT: 27 mL / NET: 5701.1 mL      CAPILLARY BLOOD GLUCOSE  139 (31 Aug 2017 05:33)    PHYSICAL EXAM: pronounced - intubated, with no breath sounds or pulses.    LINES:    HOSPITAL MEDICATIONS:  heparin  Injectable 5000 Unit(s) SubCutaneous every 8 hours  piperacillin/tazobactam IVPB. 3.375 Gram(s) IV Intermittent every 12 hours  norepinephrine Infusion 0.1 MICROgram(s)/kG/Min IV Continuous <Continuous>  levETIRAcetam  IVPB 250 milliGRAM(s) IV Intermittent every 12 hours  sodium bicarbonate  Infusion 0.28 mEq/kG/Hr IV Continuous <Continuous>  dextrose 10%. 1000 milliLiter(s) IV Continuous <Continuous>  chlorhexidine 4% Liquid 1 Application(s) Topical daily  petrolatum Ophthalmic Ointment 1 Application(s) Both EYES two times a day    LABS:                        10.8   3.97  )-----------( 120      ( 31 Aug 2017 03:50 )             33.9     Hgb Trend: 10.8<--, 9.0<--, 8.2<--, 9.4<--  08-31    140  |  106  |  49<H>  ----------------------------<  143<H>  4.5   |  10<LL>  |  6.39<H>    Ca    5.0<LL>      31 Aug 2017 03:50  Phos  6.7     08-31  Mg     1.7     08-31    TPro  4.4<L>  /  Alb  2.2<L>  /  TBili  0.9  /  DBili  x   /  AST  349<H>  /  ALT  114<H>  /  AlkPhos  57  08-31    Creatinine Trend: 6.39<--, 6.11<--, 5.33<--  PT/INR - ( 30 Aug 2017 04:03 )   PT: 13.9 SEC;   INR: 1.24          PTT - ( 30 Aug 2017 04:03 )  PTT:48.2 SEC    Arterial Blood Gas:  08-30 @ 15:47  6.92/54/32/9/57.8/-19.5  ABG lactate: 4.8  Arterial Blood Gas:  08-30 @ 11:00  6.95/41/142/8/98.1/-20.9  ABG lactate: 3.4  Arterial Blood Gas:  08-30 @ 09:35  6.82/54/81/7/92.6/-22.7  ABG lactate: --    Venous Blood Gas:  08-29 @ 09:12  < 6.81/55/212/5/96.6  VBG Lactate: 10.5    MICROBIOLOGY:     RADIOLOGY:  [ ] Reviewed and interpreted by me    EKG:    Assessment/Plan: 78yo M with hx of metastatic carcinoic tumor BIBEMS after cardiac arrest - unclear etiology for arrest, likely aspiration. admitted on 8/30 - pronounced before rounds after CPR. No ROSC

## 2017-08-31 NOTE — GOALS OF CARE CONVERSATION - PERSONAL ADVANCE DIRECTIVE - CONVERSATION DETAILS
MICU Attending Dr. Mo discussed with family that further CPR would be medically futile and harmful to patient. Daughter agrees to no further escalation of care and DNR.

## 2017-08-31 NOTE — DISCHARGE NOTE FOR THE EXPIRED PATIENT - HOSPITAL COURSE
77 M with metastatic carcinoid tumor of colon s/p R hemicolectomy, remote hx of brain tumor s/p resection > 30 years ago, seizure disorder, and HTN BIBEMS after cardiac arrest. ROSC was obtained after 15mins of CPR with 2 rounds of epi. Unclear etiology for arrest, although suspect aspiration event as EMS CT head on admission revealed signs of early anoxic brain injury. Patient continued to deteriorate while in the MICU with multiorgan dysfunction and worsening acidosis. He  on  at 8:25AM. 77 M with metastatic carcinoid tumor of colon s/p R hemicolectomy, remote hx of brain tumor s/p resection > 30 years ago, seizure disorder, and HTN BIBEMS after cardiac arrest. ROSC was obtained after 15mins of CPR with 2 rounds of epi. 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 CT head on admission revealed signs of early anoxic brain injury. Patient continued to deteriorate while in the MICU with multiorgan dysfunction and worsening acidosis. He  on  at 8:25AM. 77 M with metastatic carcinoid tumor of colon s/p R hemicolectomy, remote hx of brain tumor s/p resection > 30 years ago, seizure disorder, and HTN BIBEMS after cardiac arrest. ROSC was obtained after 15mins of CPR with 2 rounds of epi. Unclear etiology for arrest, although suspect aspiration event as EMS found pt with food in oral cavity or acidemia secondary to SBO secndary top carcinomatosis. CT head on admission revealed signs of anoxic brain injury. Patient continued to deteriorate while in the MICU with multiorgan dysfunction and worsening acidosis. He  on  at 8:25AM.

## 2017-08-31 NOTE — PROGRESS NOTE ADULT - ATTENDING COMMENTS
Hypoxic respiratory failure with multiorgan failure secondary to CAC in setting of metastatic GI malignancy.  Patient had cardiac arrest before morning MICU rounds.  ICU team responded and started CPR. CPR efforts were unsuccessful, not able to achieve ROSC, patient , family at the bedside.
Hypoxic respiratory failure with multiorgan failure secondary to CAC in setting of malignancy with poor prognosis, HCT findings correlating with anoxic brain injury.  Continue MV, adjusting vent setting based on gas exchange.  Continue pressors.  starting HCO3- gtt for Non AG metabolic acidosis in addition to AG metabolic acidosis.  Empiric abx.  Frequent bedside visit, pending family discussion regarding poor prognosis and GOC.

## 2017-09-03 LAB
BACTERIA BLD CULT: SIGNIFICANT CHANGE UP
BACTERIA BLD CULT: SIGNIFICANT CHANGE UP

## 2024-09-16 NOTE — H&P ADULT - ATTENDING COMMENTS
Imaging Studies/Medications cardiac arrest, unclear etiology/hypoxemic resp failure/shock state/guarded

## 2025-04-07 NOTE — H&P ADULT - PROBLEM SELECTOR PROBLEM 4
Seizure disorder [FreeTextEntry1] : Annual wellness visit [de-identified] : Pt here for annual wellness visit. c/o throat pain when he swallows.  Had white spot on tonsil.  Pain started a month ago on the left side.  Eventually migrated to the right side.  Never fully went away. No fevers over this past month. No chills.  No sinus pain.  No n/v.  No HA.  Had sneezing and mild cough early on.  No other people around him have symptoms.  Wonders about seasonal allergies.  Not using any allergy treatment right now.  Uses Zyrtec D when he gets allergy symptoms. Threw his back out in January.  Was on a muscle relaxant and naproxen.  Out of commission for three days then got slowly better. No weight loss.  Hasn't done any exercise in 18-24 months. Would like to do a coffee shop/.  May have a partner. Uses electronic cigarettes. Likes push-ups and elliptical machines.  But can't get into it.  Has bicycle as well. Lives at home with spouse and two kids.  Oldest son in 5th grade.  John. Didn't get flu or COVID shots in the fall.